# Patient Record
Sex: FEMALE | HISPANIC OR LATINO | Employment: FULL TIME | URBAN - METROPOLITAN AREA
[De-identification: names, ages, dates, MRNs, and addresses within clinical notes are randomized per-mention and may not be internally consistent; named-entity substitution may affect disease eponyms.]

---

## 2017-02-10 ENCOUNTER — OFFICE VISIT (OUTPATIENT)
Dept: URGENT CARE | Facility: CLINIC | Age: 32
End: 2017-02-10
Payer: COMMERCIAL

## 2017-02-10 VITALS
BODY MASS INDEX: 36.82 KG/M2 | TEMPERATURE: 98.8 F | HEIGHT: 61 IN | DIASTOLIC BLOOD PRESSURE: 70 MMHG | SYSTOLIC BLOOD PRESSURE: 114 MMHG | WEIGHT: 195 LBS | RESPIRATION RATE: 16 BRPM | OXYGEN SATURATION: 98 % | HEART RATE: 84 BPM

## 2017-02-10 DIAGNOSIS — J02.9 EXUDATIVE PHARYNGITIS: Primary | ICD-10-CM

## 2017-02-10 DIAGNOSIS — Z20.818 STREP THROAT EXPOSURE: ICD-10-CM

## 2017-02-10 LAB
INT CON NEG: NEGATIVE
INT CON POS: POSITIVE
S PYO AG THROAT QL: NORMAL

## 2017-02-10 PROCEDURE — 99204 OFFICE O/P NEW MOD 45 MIN: CPT | Performed by: PHYSICIAN ASSISTANT

## 2017-02-10 PROCEDURE — 87880 STREP A ASSAY W/OPTIC: CPT | Performed by: PHYSICIAN ASSISTANT

## 2017-02-10 RX ORDER — AZITHROMYCIN 250 MG/1
TABLET, FILM COATED ORAL
Qty: 6 TAB | Refills: 0 | Status: SHIPPED | OUTPATIENT
Start: 2017-02-10 | End: 2017-02-15

## 2017-02-10 NOTE — MR AVS SNAPSHOT
"        Amy Correa   2/10/2017 5:45 PM   Office Visit   MRN: [id number]    Department:  [facility]   Dept Phone:  [phone]    Description:  Female : 1985   Provider:  Tristen Watson PA-C           Reason for Visit     Pharyngitis son had strep and daughter just went home with strep, sore throat started last night wiht dryness       Allergies as of 2/10/2017     Allergen Noted Reactions    Penicillins 2012   Rash      You were diagnosed with     Exudative pharyngitis   [438777]  -  Primary     Strep throat exposure   [[id number]]         Vital Signs     Blood Pressure Pulse Temperature Respirations Height Weight    114/70 mmHg 84 37.1 °C (98.8 °F) 16 1.549 m (5' 1\") 88.451 kg (195 lb)    Body Mass Index Oxygen Saturation Last Menstrual Period Breastfeeding? Smoking Status       36.86 kg/m2 98% 10/14/2013 No Never Smoker        Basic Information     Date Of Birth Sex Race Ethnicity Preferred Language    1985 Female  or   Origin (Indian,Surinamese,Sammarinese,Gibraltarian, etc) English      Problem List              ICD-10-CM Priority Class Noted - Resolved    Family planning education, guidance, and counseling Z30.09   2014 - Present      Health Maintenance        Date Due Completion Dates    IMM INFLUENZA (1) 2016 ---    PAP SMEAR 2017    IMM DTaP/Tdap/Td Vaccine (2 - Td) 6/3/2024 6/3/2014            Current Immunizations     Tdap Vaccine 6/3/2014      Below and/or attached are the medications your provider expects you to take. Review all of your home medications and newly ordered medications with your provider and/or pharmacist. Follow medication instructions as directed by your provider and/or pharmacist. Please keep your medication list with you and share with your provider. Update the information when medications are discontinued, doses are changed, or new medications (including over-the-counter products) are added; and carry medication " information at all times in the event of emergency situations     Allergies:  PENICILLINS - Rash               Medications  Valid as of: February 10, 2017 -  6:06 PM    Generic Name Brand Name Tablet Size Instructions for use    Azithromycin (Tab) ZITHROMAX 250 MG Z-pack: use as directed        Docusate Sodium (Cap) COLACE 100 MG Take 1 Cap by mouth 2 times a day.        Ferrous Sulfate (Tablet Delayed Response) ferrous sulfate 325 (65 FE) MG Take 1 Tab by mouth 3 times a day, with meals.        Hydrocodone-Acetaminophen (Tab) NORCO 5-325 MG Take 1 Tab by mouth every four hours as needed ((Pain Scale 4-6); if allergic/unable to tolerate oxycodone, or if acetaminophen ineffective).        Ibuprofen (Tab) MOTRIN 600 MG Take 1 Tab by mouth every 6 hours as needed (Cramping).        Nitrofurantoin Monohyd Macro (Cap) MACROBID 100 MG Take 1 Cap by mouth 2 times a day.        Polyethylene Glycol 3350 (Pack) MIRALAX  Take 1 Packet by mouth every day.        Prenatal Vit-Fe Fumarate-FA (Tab) PRENATAL S 27-0.8 MG Take 1 Tab by mouth every morning.        .                 Medicines prescribed today were sent to:     [id number] - [place], [place] - [address]    [address] [place] [address]    Phone: [phone] Fax: [phone]    Open 24 Hours?: No      Medication refill instructions:       If your prescription bottle indicates you have medication refills left, it is not necessary to call your provider’s office. Please contact your pharmacy and they will refill your medication.    If your prescription bottle indicates you do not have any refills left, you may request refills at any time through one of the following ways: The online [employer] system (except Urgent Care), by calling your provider’s office, or by asking your pharmacy to contact your provider’s office with a refill request. Medication refills are processed only during regular business hours and may not be available until the next business day.  "Your provider may request additional information or to have a follow-up visit with you prior to refilling your medication.   *Please Note: Medication refills are assigned a new Rx number when refilled electronically. Your pharmacy may indicate that no refills were authorized even though a new prescription for the same medication is available at the pharmacy. Please request the medicine by name with the pharmacy before contacting your provider for a refill.        Instructions    Strep Throat  Strep throat is an infection of the throat caused by a bacteria named Streptococcus pyogenes. Your health care provider may call the infection streptococcal \"tonsillitis\" or \"pharyngitis\" depending on whether there are signs of inflammation in the tonsils or back of the throat. Strep throat is most common in children aged 5-15 years during the cold months of the year, but it can occur in people of any age during any season. This infection is spread from person to person (contagious) through coughing, sneezing, or other close contact.  SIGNS AND SYMPTOMS   · Fever or chills.  · Painful, swollen, red tonsils or throat.  · Pain or difficulty when swallowing.  · White or yellow spots on the tonsils or throat.  · Swollen, tender lymph nodes or \"glands\" of the neck or under the jaw.  · Red rash all over the body (rare).  DIAGNOSIS   Many different infections can cause the same symptoms. A test must be done to confirm the diagnosis so the right treatment can be given. A \"rapid strep test\" can help your health care provider make the diagnosis in a few minutes. If this test is not available, a light swab of the infected area can be used for a throat culture test. If a throat culture test is done, results are usually available in a day or two.  TREATMENT   Strep throat is treated with antibiotic medicine.  HOME CARE INSTRUCTIONS   · Gargle with 1 tsp of salt in 1 cup of warm water, 3-4 times per day or as needed for comfort.  · Family " members who also have a sore throat or fever should be tested for strep throat and treated with antibiotics if they have the strep infection.  · Make sure everyone in your household washes their hands well.  · Do not share food, drinking cups, or personal items that could cause the infection to spread to others.  · You may need to eat a soft food diet until your sore throat gets better.  · Drink enough water and fluids to keep your urine clear or pale yellow. This will help prevent dehydration.  · Get plenty of rest.  · Stay home from school, day care, or work until you have been on antibiotics for 24 hours.  · Take medicines only as directed by your health care provider.  · Take your antibiotic medicine as directed by your health care provider. Finish it even if you start to feel better.  SEEK MEDICAL CARE IF:   · The glands in your neck continue to enlarge.  · You develop a rash, cough, or earache.  · You cough up green, yellow-brown, or bloody sputum.  · You have pain or discomfort not controlled by medicines.  · Your problems seem to be getting worse rather than better.  · You have a fever.  SEEK IMMEDIATE MEDICAL CARE IF:   · You develop any new symptoms such as vomiting, severe headache, stiff or painful neck, chest pain, shortness of breath, or trouble swallowing.  · You develop severe throat pain, drooling, or changes in your voice.  · You develop swelling of the neck, or the skin on the neck becomes red and tender.  · You develop signs of dehydration, such as fatigue, dry mouth, and decreased urination.  · You become increasingly sleepy, or you cannot wake up completely.  MAKE SURE YOU:  · Understand these instructions.  · Will watch your condition.  · Will get help right away if you are not doing well or get worse.     This information is not intended to replace advice given to you by your health care provider. Make sure you discuss any questions you have with your health care provider.     Document  Released: 12/15/2001 Document Revised: 01/08/2016 Document Reviewed: 04/11/2016  [employer] Interactive Patient Education ©2016 Elsevier Inc.            [employer] System Enterprise Access Code: [email or web address]  Expires: 2/24/2017 12:17 PM    Process System Enterprise  A secure, online tool to manage your health information     [employer]’s Process System Enterprise® is a secure, online tool that connects you to your personalized health information from the privacy of your home -- day or night - making it very easy for you to manage your healthcare. Once the activation process is completed, you can even access your medical information using the Process System Enterprise rhea, which is available for free in the Apple Rhea store or Google Play store.     Process System Enterprise provides the following levels of access (as shown below):   My Chart Features   [facility] Doctor [facility]  Specialists [facility]  Urgent  Care Non-[facility]  Primary Care  Doctor   Email your healthcare team securely and privately 24/7 X X X    Manage appointments: schedule your next appointment; view details of past/upcoming appointments X      Request prescription refills. X      View recent personal medical records, including lab and immunizations X X X X   View health record, including health history, allergies, medications X X X X   Read reports about your outpatient visits, procedures, consult and ER notes X X X X   See your discharge summary, which is a recap of your hospital and/or ER visit that includes your diagnosis, lab results, and care plan. X X       How to register for Process System Enterprise:  1. Go to  [email or web address].  2. Click on the Sign Up Now box, which takes you to the New Member Sign Up page. You will need to provide the following information:  a. Enter your [employer] System Enterprise Access Code exactly as it appears at the top of this page. (You will not need to use this code after you’ve completed the sign-up process. If you do not sign up before the expiration date, you must request a new code.)   b. Enter your date of birth.    c. Enter your home email address.   d. Click Submit, and follow the next screen’s instructions.  3. Create a [employer] ID. This will be your 4-Tell login ID and cannot be changed, so think of one that is secure and easy to remember.  4. Create a [employer] password. You can change your password at any time.  5. Enter your Password Reset Question and Answer. This can be used at a later time if you forget your password.   6. Enter your e-mail address. This allows you to receive e-mail notifications when new information is available in 4-Tell.  7. Click Sign Up. You can now view your health information.    For assistance activating your [employer]-Tell account, call [phone]

## 2017-02-11 NOTE — PATIENT INSTRUCTIONS
"Strep Throat  Strep throat is an infection of the throat caused by a bacteria named Streptococcus pyogenes. Your health care provider may call the infection streptococcal \"tonsillitis\" or \"pharyngitis\" depending on whether there are signs of inflammation in the tonsils or back of the throat. Strep throat is most common in children aged 5-15 years during the cold months of the year, but it can occur in people of any age during any season. This infection is spread from person to person (contagious) through coughing, sneezing, or other close contact.  SIGNS AND SYMPTOMS   · Fever or chills.  · Painful, swollen, red tonsils or throat.  · Pain or difficulty when swallowing.  · White or yellow spots on the tonsils or throat.  · Swollen, tender lymph nodes or \"glands\" of the neck or under the jaw.  · Red rash all over the body (rare).  DIAGNOSIS   Many different infections can cause the same symptoms. A test must be done to confirm the diagnosis so the right treatment can be given. A \"rapid strep test\" can help your health care provider make the diagnosis in a few minutes. If this test is not available, a light swab of the infected area can be used for a throat culture test. If a throat culture test is done, results are usually available in a day or two.  TREATMENT   Strep throat is treated with antibiotic medicine.  HOME CARE INSTRUCTIONS   · Gargle with 1 tsp of salt in 1 cup of warm water, 3-4 times per day or as needed for comfort.  · Family members who also have a sore throat or fever should be tested for strep throat and treated with antibiotics if they have the strep infection.  · Make sure everyone in your household washes their hands well.  · Do not share food, drinking cups, or personal items that could cause the infection to spread to others.  · You may need to eat a soft food diet until your sore throat gets better.  · Drink enough water and fluids to keep your urine clear or pale yellow. This will help prevent " dehydration.  · Get plenty of rest.  · Stay home from school, day care, or work until you have been on antibiotics for 24 hours.  · Take medicines only as directed by your health care provider.  · Take your antibiotic medicine as directed by your health care provider. Finish it even if you start to feel better.  SEEK MEDICAL CARE IF:   · The glands in your neck continue to enlarge.  · You develop a rash, cough, or earache.  · You cough up green, yellow-brown, or bloody sputum.  · You have pain or discomfort not controlled by medicines.  · Your problems seem to be getting worse rather than better.  · You have a fever.  SEEK IMMEDIATE MEDICAL CARE IF:   · You develop any new symptoms such as vomiting, severe headache, stiff or painful neck, chest pain, shortness of breath, or trouble swallowing.  · You develop severe throat pain, drooling, or changes in your voice.  · You develop swelling of the neck, or the skin on the neck becomes red and tender.  · You develop signs of dehydration, such as fatigue, dry mouth, and decreased urination.  · You become increasingly sleepy, or you cannot wake up completely.  MAKE SURE YOU:  · Understand these instructions.  · Will watch your condition.  · Will get help right away if you are not doing well or get worse.     This information is not intended to replace advice given to you by your health care provider. Make sure you discuss any questions you have with your health care provider.     Document Released: 12/15/2001 Document Revised: 01/08/2016 Document Reviewed: 04/11/2016  [employer] Interactive Patient Education ©2016 Elsevier Inc.

## 2017-02-11 NOTE — PROGRESS NOTES
Subjective:      PT is a 31 y.o. female who presents with Pharyngitis            Pharyngitis   This is a new problem. The current episode started yesterday. The problem has been gradually worsening. The pain is worse on the left side. There has been no fever. The pain is at a severity of 7/10. The pain is moderate. She has had exposure to strep. Exposure to: both of her children have STREP A. She has tried cool liquids and gargles for the symptoms. The treatment provided no relief.   PT presents to  clinic today complaining of sore throat, fevers, chills, watery eyes, pressure in ears, cough, fatigue, runny nose. PT denies CP, SOB, NVD, abdominal pain, joint pain. PT states these symptoms began around 2 days ago and that the pt's family has been sick on and off for the last week. Pt has not taken any medications for this condition. PT states the pain is a 3/10 with swallowing, aching in nature and worse at night. The pt's medication list, problem list, and allergies have been evaluated and reviewed during today's visit.      PMH:  Negative per pt.      PSH:  Past Surgical History   Procedure Laterality Date   • Dilation and curettage          • Hernia repair       9 yrs old       Fam Hx:    family history includes Diabetes in her maternal grandmother and maternal uncle.  Family Status   Relation Status Death Age   • Maternal Uncle Alive    • Maternal Grandmother     • Mother     • Father     • Sister Alive        Soc HX:  Social History     Social History   • Marital Status:      Spouse Name: N/A   • Number of Children: N/A   • Years of Education: N/A     Occupational History   • Not on file.     Social History Main Topics   • Smoking status: Never Smoker    • Smokeless tobacco: Not on file   • Alcohol Use: No      Comment: occ   • Drug Use: No   • Sexual Activity: Not on file     Other Topics Concern   • Not on file     Social History Narrative         Medications:    Current  outpatient prescriptions:   •  azithromycin (ZITHROMAX) 250 MG Tab, Z-pack: use as directed, Disp: 6 Tab, Rfl: 0  •  nitrofurantoin monohydr macro (MACROBID) 100 MG CAPS, Take 1 Cap by mouth 2 times a day., Disp: 20 Cap, Rfl: 0  •  docusate sodium (COLACE) 100 MG CAPS, Take 1 Cap by mouth 2 times a day., Disp: 60 Cap, Rfl: 3  •  polyethylene glycol/lytes (MIRALAX) PACK, Take 1 Packet by mouth every day., Disp: 1 Each, Rfl: 10  •  hydrocodone-acetaminophen (NORCO) 5-325 MG TABS per tablet, Take 1 Tab by mouth every four hours as needed ((Pain Scale 4-6); if allergic/unable to tolerate oxycodone, or if acetaminophen ineffective)., Disp: 20 Tab, Rfl: 0  •  ibuprofen (MOTRIN) 600 MG TABS, Take 1 Tab by mouth every 6 hours as needed (Cramping)., Disp: 30 Tab, Rfl: 1  •  ferrous sulfate 325 (65 FE) MG EC tablet, Take 1 Tab by mouth 3 times a day, with meals., Disp: 90 Each, Rfl: 3  •  prenatal vit/fe fumarate/fa (PRENATAL S) 27-0.8 MG TABS, Take 1 Tab by mouth every morning., Disp: , Rfl:       Allergies:  Penicillins        ROS  Constitutional: Positive for chills and malaise/fatigue.   HENT: Positive for congestion and sore throat. Negative for ear pain.    Eyes: Negative for blurred vision, double vision and photophobia.   Respiratory: Positive for cough and sputum production. Negative for hemoptysis, shortness of breath and wheezing.    Cardiovascular: Negative for chest pain and palpitations.   Gastrointestinal: Negative for nausea, vomiting, abdominal pain, diarrhea and constipation.   Genitourinary: Negative for dysuria and flank pain.   Musculoskeletal: Negative for falls and myalgias.   Skin: Negative for itching and rash.   Neurological: Positive for headaches. Negative for dizziness and tingling.   Endo/Heme/Allergies: Does not bruise/bleed easily.   Psychiatric/Behavioral: Negative for depression. The patient is not nervous/anxious.             Objective:     /70 mmHg  Pulse 84  Temp(Src) 37.1 °C (98.8  "°F)  Resp 16  Ht 1.549 m (5' 1\")  Wt 88.451 kg (195 lb)  BMI 36.86 kg/m2  SpO2 98%  LMP 10/14/2013  Breastfeeding? No     Physical Exam       Constitutional: PT is oriented to person, place, and time. PT appears well-developed and well-nourished. No distress.   HENT:   Head: Normocephalic and atraumatic.   Right Ear: Hearing, tympanic membrane, external ear and ear canal normal.   Left Ear: Hearing, tympanic membrane, external ear and ear canal normal.   Nose: Mucosal edema, rhinorrhea and sinus tenderness present. Right sinus exhibits frontal sinus tenderness. Left sinus exhibits frontal sinus tenderness.   Mouth/Throat: Uvula is midline. Mucous membranes are pale. Posterior oropharyngeal edema and posterior oropharyngeal erythema with exudate noted on exam  Eyes: Conjunctivae normal and EOM are normal. Pupils are equal, round, and reactive to light.   Neck: Normal range of motion. Neck supple. No thyromegaly present.   Cardiovascular: Normal rate, regular rhythm, normal heart sounds and intact distal pulses.  Exam reveals no gallop and no friction rub.    No murmur heard.  Pulmonary/Chest: Effort normal and breath sounds normal. No respiratory distress. PT has no wheezes. PT has no rales. PT exhibits no tenderness.   Abdominal: Soft. Bowel sounds are normal. PT exhibits no distension and no mass. There is no tenderness. There is no rebound and no guarding.   Musculoskeletal: Normal range of motion. PT exhibits no edema and no tenderness.   Lymphadenopathy:     PT has no cervical adenopathy.   Neurological: PT is alert and oriented to person, place, and time. PT displays normal reflexes. No cranial nerve deficit. PT exhibits normal muscle tone. Coordination normal.   Skin: Skin is warm and dry. No rash noted. No erythema.   Psychiatric: PT has a normal mood and affect. PT behavior is normal. Judgment and thought content normal.        Assessment/Plan:     1. Exudative pharyngitis  Back-up abx if symptoms do " not improve in 2-3 days. PT on clinical presentation has exudate on oropharynx and it's possible beyond the strep A testing that this could be a different type of strep (C/G) or A. haemolyticum     - POCT Rapid Strep A-->NEG  - azithromycin (ZITHROMAX) 250 MG Tab; Z-pack: use as directed  Dispense: 6 Tab; Refill: 0    2. Strep throat exposure    - POCT Rapid Strep A  - azithromycin (ZITHROMAX) 250 MG Tab; Z-pack: use as directed  Dispense: 6 Tab; Refill: 0    Rest, fluids encouraged.  OTC decongestant for congestion  AVS with medical info given.  Pt was in full understanding and agreement with the plan.  Follow-up as needed if symptoms worsen or fail to improve.

## 2017-03-22 ENCOUNTER — OFFICE VISIT (OUTPATIENT)
Dept: URGENT CARE | Facility: CLINIC | Age: 32
End: 2017-03-22
Payer: COMMERCIAL

## 2017-03-22 VITALS
RESPIRATION RATE: 16 BRPM | DIASTOLIC BLOOD PRESSURE: 78 MMHG | TEMPERATURE: 97.9 F | BODY MASS INDEX: 36.06 KG/M2 | HEART RATE: 93 BPM | OXYGEN SATURATION: 100 % | HEIGHT: 61 IN | WEIGHT: 191 LBS | SYSTOLIC BLOOD PRESSURE: 122 MMHG

## 2017-03-22 DIAGNOSIS — J40 BRONCHITIS: ICD-10-CM

## 2017-03-22 DIAGNOSIS — J06.9 UPPER RESPIRATORY TRACT INFECTION, UNSPECIFIED TYPE: ICD-10-CM

## 2017-03-22 PROCEDURE — 99214 OFFICE O/P EST MOD 30 MIN: CPT | Performed by: EMERGENCY MEDICINE

## 2017-03-22 RX ORDER — AZITHROMYCIN 250 MG/1
TABLET, FILM COATED ORAL
Qty: 6 TAB | Refills: 0 | Status: SHIPPED | OUTPATIENT
Start: 2017-03-22 | End: 2017-06-26

## 2017-03-22 RX ORDER — ALBUTEROL SULFATE 90 UG/1
2 AEROSOL, METERED RESPIRATORY (INHALATION) EVERY 4 HOURS PRN
Qty: 1 INHALER | Refills: 0 | Status: SHIPPED | OUTPATIENT
Start: 2017-03-22 | End: 2017-06-26

## 2017-03-22 ASSESSMENT — ENCOUNTER SYMPTOMS
DIARRHEA: 0
NAUSEA: 0
COUGH: 1
FEVER: 0
SORE THROAT: 0
VOMITING: 0

## 2017-03-22 NOTE — PROGRESS NOTES
Subjective:      Amy Correa is a 31 y.o. female who presents with Cough        This is a new problem.    HPI She states that 2 days ago she developed upper respiratory congestion and a cough. The cough has been primarily dry but has been associated with some wheezing, especially at night. The nasal secretions have been clear. She does not have a history of asthma. She has had no fever or chills. Her son was diagnosed with strep throat 10 days ago and just finished his antibiotics. She does not have a sore throat.    Review of Systems   Constitutional: Negative for fever.   HENT: Positive for congestion. Negative for ear pain and sore throat.    Respiratory: Positive for cough.    Gastrointestinal: Negative for nausea, vomiting and diarrhea.     Past Medical History   Diagnosis Date   • Known health problems: none        Past Surgical History   Procedure Laterality Date   • Dilation and curettage       2011   • Hernia repair       9 yrs old         Current outpatient prescriptions:   •  albuterol 108 (90 BASE) MCG/ACT Aero Soln inhalation aerosol, Inhale 2 Puffs by mouth every four hours as needed for Shortness of Breath., Disp: 1 Inhaler, Rfl: 0  •  azithromycin (ZITHROMAX) 250 MG Tab, Use as directed, Disp: 6 Tab, Rfl: 0  •  nitrofurantoin monohydr macro (MACROBID) 100 MG CAPS, Take 1 Cap by mouth 2 times a day., Disp: 20 Cap, Rfl: 0  •  docusate sodium (COLACE) 100 MG CAPS, Take 1 Cap by mouth 2 times a day., Disp: 60 Cap, Rfl: 3  •  polyethylene glycol/lytes (MIRALAX) PACK, Take 1 Packet by mouth every day., Disp: 1 Each, Rfl: 10  •  hydrocodone-acetaminophen (NORCO) 5-325 MG TABS per tablet, Take 1 Tab by mouth every four hours as needed ((Pain Scale 4-6); if allergic/unable to tolerate oxycodone, or if acetaminophen ineffective)., Disp: 20 Tab, Rfl: 0  •  ibuprofen (MOTRIN) 600 MG TABS, Take 1 Tab by mouth every 6 hours as needed (Cramping)., Disp: 30 Tab, Rfl: 1  •  ferrous sulfate 325 (65 FE) MG EC  "tablet, Take 1 Tab by mouth 3 times a day, with meals., Disp: 90 Each, Rfl: 3  •  prenatal vit/fe fumarate/fa (PRENATAL S) 27-0.8 MG TABS, Take 1 Tab by mouth every morning., Disp: , Rfl:     Allergies   Allergen Reactions   • Penicillins Rash       Social History     Social History   • Marital Status:      Spouse Name: N/A   • Number of Children: N/A   • Years of Education: N/A     Social History Main Topics   • Smoking status: Never Smoker    • Smokeless tobacco: None   • Alcohol Use: No      Comment: occ   • Drug Use: No   • Sexual Activity: Not Asked     Other Topics Concern   • None     Social History Narrative       Family History   Problem Relation Age of Onset   • Diabetes Maternal Uncle    • Diabetes Maternal Grandmother        Family Status   Relation Status Death Age   • Maternal Uncle Alive    • Maternal Grandmother     • Mother     • Father     • Sister Alive                Objective:     /78 mmHg  Pulse 93  Temp(Src) 36.6 °C (97.9 °F)  Resp 16  Ht 1.549 m (5' 1\")  Wt 86.637 kg (191 lb)  BMI 36.11 kg/m2  SpO2 100%  LMP 2017     Physical Exam   Constitutional: She is oriented to person, place, and time. She appears well-developed and well-nourished. No distress.   HENT:   Head: Normocephalic.   Mouth/Throat: Oropharynx is clear and moist. No oropharyngeal exudate.   Tympanic membranes and ear canals are normal. Her nose is congested.   Eyes: Conjunctivae are normal.   Neck: Normal range of motion. Neck supple.   Cardiovascular: Normal rate, regular rhythm and normal heart sounds.    Pulmonary/Chest: Effort normal. No respiratory distress.   Her lungs are clear with quiet breathing but there is wheezing heard when she coughs. There are no rales or rhonchi.   Musculoskeletal: Normal range of motion.   Neurological: She is alert and oriented to person, place, and time.   Gait and speech are normal   Skin: Skin is warm and dry.   Psychiatric: She has a " normal mood and affect.   Vitals reviewed.       At this time for her history and exam are consistent with a viral upper respiratory infection and bronchitis. I prescribed an albuterol inhaler to use as needed for wheezing. I have told her that at this time she does not need antibiotics but I have written a backup prescription for Zithromax in case she should worsen with fever and significant purulent sputum. She was given written instructions for an upper respiratory infection and bronchitis. I have recommended Robitussin-DM or equivalent for her cough.       Assessment/Plan:     1. Bronchitis    - albuterol 108 (90 BASE) MCG/ACT Aero Soln inhalation aerosol; Inhale 2 Puffs by mouth every four hours as needed for Shortness of Breath.  Dispense: 1 Inhaler; Refill: 0  - azithromycin (ZITHROMAX) 250 MG Tab; Use as directed  Dispense: 6 Tab; Refill: 0    2. Upper respiratory tract infection, unspecified type

## 2017-03-22 NOTE — PATIENT INSTRUCTIONS
Acute Bronchitis  At this time your history and exam are consistent with a viral upper respiratory infection and viral bronchitis. I have written a backup prescription of Zithromax to fill if you develop fever or yellow and green sputum. Albuterol inhaler as needed for wheezing.  Bronchitis is inflammation of the airways that extend from the windpipe into the lungs (bronchi). The inflammation often causes mucus to develop. This leads to a cough, which is the most common symptom of bronchitis.   In acute bronchitis, the condition usually develops suddenly and goes away over time, usually in a couple weeks. Smoking, allergies, and asthma can make bronchitis worse. Repeated episodes of bronchitis may cause further lung problems.   CAUSES  Acute bronchitis is most often caused by the same virus that causes a cold. The virus can spread from person to person (contagious) through coughing, sneezing, and touching contaminated objects.  SIGNS AND SYMPTOMS   · Cough.    · Fever.    · Coughing up mucus.    · Body aches.    · Chest congestion.    · Chills.    · Shortness of breath.    · Sore throat.    DIAGNOSIS   Acute bronchitis is usually diagnosed through a physical exam. Your health care provider will also ask you questions about your medical history. Tests, such as chest X-rays, are sometimes done to rule out other conditions.   TREATMENT   Acute bronchitis usually goes away in a couple weeks. Oftentimes, no medical treatment is necessary. Medicines are sometimes given for relief of fever or cough. Antibiotic medicines are usually not needed but may be prescribed in certain situations. In some cases, an inhaler may be recommended to help reduce shortness of breath and control the cough. A cool mist vaporizer may also be used to help thin bronchial secretions and make it easier to clear the chest.   HOME CARE INSTRUCTIONS  · Get plenty of rest.    · Drink enough fluids to keep your urine clear or pale yellow (unless you  have a medical condition that requires fluid restriction). Increasing fluids may help thin your respiratory secretions (sputum) and reduce chest congestion, and it will prevent dehydration.    · Take medicines only as directed by your health care provider.  · If you were prescribed an antibiotic medicine, finish it all even if you start to feel better.  · Avoid smoking and secondhand smoke. Exposure to cigarette smoke or irritating chemicals will make bronchitis worse. If you are a smoker, consider using nicotine gum or skin patches to help control withdrawal symptoms. Quitting smoking will help your lungs heal faster.    · Reduce the chances of another bout of acute bronchitis by washing your hands frequently, avoiding people with cold symptoms, and trying not to touch your hands to your mouth, nose, or eyes.    · Keep all follow-up visits as directed by your health care provider.    SEEK MEDICAL CARE IF:  Your symptoms do not improve after 1 week of treatment.   SEEK IMMEDIATE MEDICAL CARE IF:  · You develop an increased fever or chills.    · You have chest pain.    · You have severe shortness of breath.  · You have bloody sputum.    · You develop dehydration.  · You faint or repeatedly feel like you are going to pass out.  · You develop repeated vomiting.  · You develop a severe headache.  MAKE SURE YOU:   · Understand these instructions.  · Will watch your condition.  · Will get help right away if you are not doing well or get worse.     This information is not intended to replace advice given to you by your health care provider. Make sure you discuss any questions you have with your health care provider.     Document Released: 01/25/2006 Document Revised: 01/08/2016 Document Reviewed: 06/10/2014  [employer] Interactive Patient Education ©2016 [employer]    Upper Respiratory Infection, Adult  Most upper respiratory infections (URIs) are a viral infection of the air passages leading to the lungs. A URI affects  "the nose, throat, and upper air passages. The most common type of URI is nasopharyngitis and is typically referred to as \"the common cold.\"  URIs run their course and usually go away on their own. Most of the time, a URI does not require medical attention, but sometimes a bacterial infection in the upper airways can follow a viral infection. This is called a secondary infection. Sinus and middle ear infections are common types of secondary upper respiratory infections.  Bacterial pneumonia can also complicate a URI. A URI can worsen asthma and chronic obstructive pulmonary disease (COPD). Sometimes, these complications can require emergency medical care and may be life threatening.   CAUSES  Almost all URIs are caused by viruses. A virus is a type of germ and can spread from one person to another.   RISKS FACTORS  You may be at risk for a URI if:   · You smoke.    · You have chronic heart or lung disease.  · You have a weakened defense (immune) system.    · You are very young or very old.    · You have nasal allergies or asthma.  · You work in crowded or poorly ventilated areas.  · You work in health care facilities or schools.  SIGNS AND SYMPTOMS   Symptoms typically develop 2-3 days after you come in contact with a cold virus. Most viral URIs last 7-10 days. However, viral URIs from the influenza virus (flu virus) can last 14-18 days and are typically more severe. Symptoms may include:   · Runny or stuffy (congested) nose.    · Sneezing.    · Cough.    · Sore throat.    · Headache.    · Fatigue.    · Fever.    · Loss of appetite.    · Pain in your forehead, behind your eyes, and over your cheekbones (sinus pain).  · Muscle aches.    DIAGNOSIS   Your health care provider may diagnose a URI by:  · Physical exam.  · Tests to check that your symptoms are not due to another condition such as:  · Strep throat.  · Sinusitis.  · Pneumonia.  · Asthma.  TREATMENT   A URI goes away on its own with time. It cannot be cured " with medicines, but medicines may be prescribed or recommended to relieve symptoms. Medicines may help:  · Reduce your fever.  · Reduce your cough.  · Relieve nasal congestion.  HOME CARE INSTRUCTIONS   · Take medicines only as directed by your health care provider.    · Gargle warm saltwater or take cough drops to comfort your throat as directed by your health care provider.  · Use a warm mist humidifier or inhale steam from a shower to increase air moisture. This may make it easier to breathe.  · Drink enough fluid to keep your urine clear or pale yellow.    · Eat soups and other clear broths and maintain good nutrition.    · Rest as needed.    · Return to work when your temperature has returned to normal or as your health care provider advises. You may need to stay home longer to avoid infecting others. You can also use a face mask and careful hand washing to prevent spread of the virus.  · Increase the usage of your inhaler if you have asthma.    · Do not use any tobacco products, including cigarettes, chewing tobacco, or electronic cigarettes. If you need help quitting, ask your health care provider.  PREVENTION   The best way to protect yourself from getting a cold is to practice good hygiene.   · Avoid oral or hand contact with people with cold symptoms.    · Wash your hands often if contact occurs.    There is no clear evidence that vitamin C, vitamin E, echinacea, or exercise reduces the chance of developing a cold. However, it is always recommended to get plenty of rest, exercise, and practice good nutrition.   SEEK MEDICAL CARE IF:   · You are getting worse rather than better.    · Your symptoms are not controlled by medicine.    · You have chills.  · You have worsening shortness of breath.  · You have brown or red mucus.  · You have yellow or brown nasal discharge.  · You have pain in your face, especially when you bend forward.  · You have a fever.  · You have swollen neck glands.  · You have pain while  swallowing.  · You have white areas in the back of your throat.  SEEK IMMEDIATE MEDICAL CARE IF:   · You have severe or persistent:  ¨ Headache.  ¨ Ear pain.  ¨ Sinus pain.  ¨ Chest pain.  · You have chronic lung disease and any of the following:  ¨ Wheezing.  ¨ Prolonged cough.  ¨ Coughing up blood.  ¨ A change in your usual mucus.  · You have a stiff neck.  · You have changes in your:  ¨ Vision.  ¨ Hearing.  ¨ Thinking.  ¨ Mood.  MAKE SURE YOU:   · Understand these instructions.  · Will watch your condition.  · Will get help right away if you are not doing well or get worse.     This information is not intended to replace advice given to you by your health care provider. Make sure you discuss any questions you have with your health care provider.     Document Released: 06/13/2002 Document Revised: 05/03/2016 Document Reviewed: 03/25/2015  [employer] Interactive Patient Education ©2016 [employer]

## 2017-03-22 NOTE — Clinical Note
March 22, 2017         Patient: Amy Correa   YOB: 1985   Date of Visit: 3/22/2017           To Whom it May Concern:    Amy Correa was seen in my clinic on 3/22/2017. She should be excused from work on March 23 and 24th.    If you have any questions or concerns, please don't hesitate to call.        Sincerely,           Henri Montana M.D.  Electronically Signed

## 2017-03-22 NOTE — MR AVS SNAPSHOT
"        Amy Correa   3/22/2017 2:30 PM   Office Visit   MRN: [id number]    Department:  [facility]   Dept Phone:  [phone]    Description:  Female : 1985   Provider:  Henri Montana M.D.           Reason for Visit     Cough hurts when coughing/pressure x 3 days      Allergies as of 3/22/2017     Allergen Noted Reactions    Penicillins 2012   Rash      You were diagnosed with     Bronchitis   [261721]       Upper respiratory tract infection, unspecified type   [4416919]         Vital Signs     Blood Pressure Pulse Temperature Respirations Height Weight    122/78 mmHg 93 36.6 °C (97.9 °F) 16 1.549 m (5' 1\") 86.637 kg (191 lb)    Body Mass Index Oxygen Saturation Last Menstrual Period Smoking Status          36.11 kg/m2 100% 2017 Never Smoker         Basic Information     Date Of Birth Sex Race Ethnicity Preferred Language    1985 Female  or   Origin ([place],Chilean,Qatari,Felix, etc) English      Problem List              ICD-10-CM Priority Class Noted - Resolved    Family planning education, guidance, and counseling Z30.09   2014 - Present      Health Maintenance        Date Due Completion Dates    IMM INFLUENZA (1) 2016 ---    PAP SMEAR 2017    IMM DTaP/Tdap/Td Vaccine (2 - Td) 6/3/2024 6/3/2014            Current Immunizations     Tdap Vaccine 6/3/2014      Below and/or attached are the medications your provider expects you to take. Review all of your home medications and newly ordered medications with your provider and/or pharmacist. Follow medication instructions as directed by your provider and/or pharmacist. Please keep your medication list with you and share with your provider. Update the information when medications are discontinued, doses are changed, or new medications (including over-the-counter products) are added; and carry medication information at all times in the event of emergency situations     Allergies:  " PENICILLINS - Rash               Medications  Valid as of: March 22, 2017 -  2:46 PM    Generic Name Brand Name Tablet Size Instructions for use    Albuterol Sulfate (Aero Soln) albuterol 108 (90 BASE) MCG/ACT Inhale 2 Puffs by mouth every four hours as needed for Shortness of Breath.        Azithromycin (Tab) ZITHROMAX 250 MG Use as directed        Docusate Sodium (Cap) COLACE 100 MG Take 1 Cap by mouth 2 times a day.        Ferrous Sulfate (Tablet Delayed Response) ferrous sulfate 325 (65 FE) MG Take 1 Tab by mouth 3 times a day, with meals.        Hydrocodone-Acetaminophen (Tab) NORCO 5-325 MG Take 1 Tab by mouth every four hours as needed ((Pain Scale 4-6); if allergic/unable to tolerate oxycodone, or if acetaminophen ineffective).        Ibuprofen (Tab) MOTRIN 600 MG Take 1 Tab by mouth every 6 hours as needed (Cramping).        Nitrofurantoin Monohyd Macro (Cap) MACROBID 100 MG Take 1 Cap by mouth 2 times a day.        Polyethylene Glycol 3350 (Pack) MIRALAX  Take 1 Packet by mouth every day.        Prenatal Vit-Fe Fumarate-FA (Tab) PRENATAL S 27-0.8 MG Take 1 Tab by mouth every morning.        .                 Medicines prescribed today were sent to:     [facility]/PHARMACY #[id number] - [place], [place] - [address]    [address] [place] [address]    Phone: [phone] Fax: [phone]    Open 24 Hours?: No      Medication refill instructions:       If your prescription bottle indicates you have medication refills left, it is not necessary to call your provider’s office. Please contact your pharmacy and they will refill your medication.    If your prescription bottle indicates you do not have any refills left, you may request refills at any time through one of the following ways: The online [employer] (ProCiv) system (except Urgent Care), by calling your provider’s office, or by asking your pharmacy to contact your provider’s office with a refill request. Medication refills are processed only during regular business  hours and may not be available until the next business day. Your provider may request additional information or to have a follow-up visit with you prior to refilling your medication.   *Please Note: Medication refills are assigned a new Rx number when refilled electronically. Your pharmacy may indicate that no refills were authorized even though a new prescription for the same medication is available at the pharmacy. Please request the medicine by name with the pharmacy before contacting your provider for a refill.        Instructions    Acute Bronchitis  At this time your history and exam are consistent with a viral upper respiratory infection and viral bronchitis. I have written a backup prescription of Zithromax to fill if you develop fever or yellow and green sputum. Albuterol inhaler as needed for wheezing.  Bronchitis is inflammation of the airways that extend from the windpipe into the lungs (bronchi). The inflammation often causes mucus to develop. This leads to a cough, which is the most common symptom of bronchitis.   In acute bronchitis, the condition usually develops suddenly and goes away over time, usually in a couple weeks. Smoking, allergies, and asthma can make bronchitis worse. Repeated episodes of bronchitis may cause further lung problems.   CAUSES  Acute bronchitis is most often caused by the same virus that causes a cold. The virus can spread from person to person (contagious) through coughing, sneezing, and touching contaminated objects.  SIGNS AND SYMPTOMS   · Cough.    · Fever.    · Coughing up mucus.    · Body aches.    · Chest congestion.    · Chills.    · Shortness of breath.    · Sore throat.    DIAGNOSIS   Acute bronchitis is usually diagnosed through a physical exam. Your health care provider will also ask you questions about your medical history. Tests, such as chest X-rays, are sometimes done to rule out other conditions.   TREATMENT   Acute bronchitis usually goes away in a couple  weeks. Oftentimes, no medical treatment is necessary. Medicines are sometimes given for relief of fever or cough. Antibiotic medicines are usually not needed but may be prescribed in certain situations. In some cases, an inhaler may be recommended to help reduce shortness of breath and control the cough. A cool mist vaporizer may also be used to help thin bronchial secretions and make it easier to clear the chest.   HOME CARE INSTRUCTIONS  · Get plenty of rest.    · Drink enough fluids to keep your urine clear or pale yellow (unless you have a medical condition that requires fluid restriction). Increasing fluids may help thin your respiratory secretions (sputum) and reduce chest congestion, and it will prevent dehydration.    · Take medicines only as directed by your health care provider.  · If you were prescribed an antibiotic medicine, finish it all even if you start to feel better.  · Avoid smoking and secondhand smoke. Exposure to cigarette smoke or irritating chemicals will make bronchitis worse. If you are a smoker, consider using nicotine gum or skin patches to help control withdrawal symptoms. Quitting smoking will help your lungs heal faster.    · Reduce the chances of another bout of acute bronchitis by washing your hands frequently, avoiding people with cold symptoms, and trying not to touch your hands to your mouth, nose, or eyes.    · Keep all follow-up visits as directed by your health care provider.    SEEK MEDICAL CARE IF:  Your symptoms do not improve after 1 week of treatment.   SEEK IMMEDIATE MEDICAL CARE IF:  · You develop an increased fever or chills.    · You have chest pain.    · You have severe shortness of breath.  · You have bloody sputum.    · You develop dehydration.  · You faint or repeatedly feel like you are going to pass out.  · You develop repeated vomiting.  · You develop a severe headache.  MAKE SURE YOU:   · Understand these instructions.  · Will watch your condition.  · Will get  "help right away if you are not doing well or get worse.     This information is not intended to replace advice given to you by your health care provider. Make sure you discuss any questions you have with your health care provider.     Document Released: 01/25/2006 Document Revised: 01/08/2016 Document Reviewed: 06/10/2014  [employer] Interactive Patient Education ©2016 [employer]    Upper Respiratory Infection, Adult  Most upper respiratory infections (URIs) are a viral infection of the air passages leading to the lungs. A URI affects the nose, throat, and upper air passages. The most common type of URI is nasopharyngitis and is typically referred to as \"the common cold.\"  URIs run their course and usually go away on their own. Most of the time, a URI does not require medical attention, but sometimes a bacterial infection in the upper airways can follow a viral infection. This is called a secondary infection. Sinus and middle ear infections are common types of secondary upper respiratory infections.  Bacterial pneumonia can also complicate a URI. A URI can worsen asthma and chronic obstructive pulmonary disease (COPD). Sometimes, these complications can require emergency medical care and may be life threatening.   CAUSES  Almost all URIs are caused by viruses. A virus is a type of germ and can spread from one person to another.   RISKS FACTORS  You may be at risk for a URI if:   · You smoke.    · You have chronic heart or lung disease.  · You have a weakened defense (immune) system.    · You are very young or very old.    · You have nasal allergies or asthma.  · You work in crowded or poorly ventilated areas.  · You work in health care facilities or schools.  SIGNS AND SYMPTOMS   Symptoms typically develop 2-3 days after you come in contact with a cold virus. Most viral URIs last 7-10 days. However, viral URIs from the influenza virus (flu virus) can last 14-18 days and are typically more severe. Symptoms may " include:   · Runny or stuffy (congested) nose.    · Sneezing.    · Cough.    · Sore throat.    · Headache.    · Fatigue.    · Fever.    · Loss of appetite.    · Pain in your forehead, behind your eyes, and over your cheekbones (sinus pain).  · Muscle aches.    DIAGNOSIS   Your health care provider may diagnose a URI by:  · Physical exam.  · Tests to check that your symptoms are not due to another condition such as:  · Strep throat.  · Sinusitis.  · Pneumonia.  · Asthma.  TREATMENT   A URI goes away on its own with time. It cannot be cured with medicines, but medicines may be prescribed or recommended to relieve symptoms. Medicines may help:  · Reduce your fever.  · Reduce your cough.  · Relieve nasal congestion.  HOME CARE INSTRUCTIONS   · Take medicines only as directed by your health care provider.    · Gargle warm saltwater or take cough drops to comfort your throat as directed by your health care provider.  · Use a warm mist humidifier or inhale steam from a shower to increase air moisture. This may make it easier to breathe.  · Drink enough fluid to keep your urine clear or pale yellow.    · Eat soups and other clear broths and maintain good nutrition.    · Rest as needed.    · Return to work when your temperature has returned to normal or as your health care provider advises. You may need to stay home longer to avoid infecting others. You can also use a face mask and careful hand washing to prevent spread of the virus.  · Increase the usage of your inhaler if you have asthma.    · Do not use any tobacco products, including cigarettes, chewing tobacco, or electronic cigarettes. If you need help quitting, ask your health care provider.  PREVENTION   The best way to protect yourself from getting a cold is to practice good hygiene.   · Avoid oral or hand contact with people with cold symptoms.    · Wash your hands often if contact occurs.    There is no clear evidence that vitamin C, vitamin E, echinacea, or  exercise reduces the chance of developing a cold. However, it is always recommended to get plenty of rest, exercise, and practice good nutrition.   SEEK MEDICAL CARE IF:   · You are getting worse rather than better.    · Your symptoms are not controlled by medicine.    · You have chills.  · You have worsening shortness of breath.  · You have brown or red mucus.  · You have yellow or brown nasal discharge.  · You have pain in your face, especially when you bend forward.  · You have a fever.  · You have swollen neck glands.  · You have pain while swallowing.  · You have white areas in the back of your throat.  SEEK IMMEDIATE MEDICAL CARE IF:   · You have severe or persistent:  ¨ Headache.  ¨ Ear pain.  ¨ Sinus pain.  ¨ Chest pain.  · You have chronic lung disease and any of the following:  ¨ Wheezing.  ¨ Prolonged cough.  ¨ Coughing up blood.  ¨ A change in your usual mucus.  · You have a stiff neck.  · You have changes in your:  ¨ Vision.  ¨ Hearing.  ¨ Thinking.  ¨ Mood.  MAKE SURE YOU:   · Understand these instructions.  · Will watch your condition.  · Will get help right away if you are not doing well or get worse.     This information is not intended to replace advice given to you by your health care provider. Make sure you discuss any questions you have with your health care provider.     Document Released: 06/13/2002 Document Revised: 05/03/2016 Document Reviewed: 03/25/2015  [employer] Interactive Patient Education ©2016 Elsevier Inc.            [employer] Access Code: [email or web address]  Expires: 4/8/2017 12:45 PM    [employer]  A secure, online tool to manage your health information     [employer]® is a secure, online tool that connects you to your personalized health information from the privacy of your home -- day or night - making it very easy for you to manage your healthcare. Once the activation process is completed, you can even access your medical information using the [employer] calos, which is  available for free in the Apple Rhea store or Google Play store.     Your[employer] Solutions provides the following levels of access (as shown below):   My Chart Features   [facility] Doctor [facility]  Specialists [facility]  Urgent  Care Non-[facility]  Primary Care  Doctor   Email your healthcare team securely and privately 24/7 X X X    Manage appointments: schedule your next appointment; view details of past/upcoming appointments X      Request prescription refills. X      View recent personal medical records, including lab and immunizations X X X X   View health record, including health history, allergies, medications X X X X   Read reports about your outpatient visits, procedures, consult and ER notes X X X X   See your discharge summary, which is a recap of your hospital and/or ER visit that includes your diagnosis, lab results, and care plan. X X       How to register for Your[employer]:  1. Go to  [email or web address].  2. Click on the Sign Up Now box, which takes you to the New Member Sign Up page. You will need to provide the following information:  a. Enter your [employer] Access Code exactly as it appears at the top of this page. (You will not need to use this code after you’ve completed the sign-up process. If you do not sign up before the expiration date, you must request a new code.)   b. Enter your date of birth.   c. Enter your home email address.   d. Click Submit, and follow the next screen’s instructions.  3. Create a Your[employer] Solutions ID. This will be your Your[employer] login ID and cannot be changed, so think of one that is secure and easy to remember.  4. Create a [employer] password. You can change your password at any time.  5. Enter your Password Reset Question and Answer. This can be used at a later time if you forget your password.   6. Enter your e-mail address. This allows you to receive e-mail notifications when new information is available in Your[employer].  7. Click Sign Up. You can now view your health information.    For  assistance activating your [employer] account, call [phone]

## 2017-04-24 ENCOUNTER — OFFICE VISIT (OUTPATIENT)
Dept: URGENT CARE | Facility: CLINIC | Age: 32
End: 2017-04-24
Payer: COMMERCIAL

## 2017-04-24 VITALS
OXYGEN SATURATION: 98 % | SYSTOLIC BLOOD PRESSURE: 120 MMHG | WEIGHT: 189 LBS | DIASTOLIC BLOOD PRESSURE: 78 MMHG | RESPIRATION RATE: 18 BRPM | HEART RATE: 112 BPM | HEIGHT: 61 IN | BODY MASS INDEX: 35.68 KG/M2 | TEMPERATURE: 99.1 F

## 2017-04-24 DIAGNOSIS — J40 BRONCHITIS: ICD-10-CM

## 2017-04-24 DIAGNOSIS — J01.00 ACUTE MAXILLARY SINUSITIS, RECURRENCE NOT SPECIFIED: ICD-10-CM

## 2017-04-24 DIAGNOSIS — R05.9 COUGH: ICD-10-CM

## 2017-04-24 PROCEDURE — 99214 OFFICE O/P EST MOD 30 MIN: CPT | Performed by: NURSE PRACTITIONER

## 2017-04-24 RX ORDER — ALBUTEROL SULFATE 90 UG/1
2 AEROSOL, METERED RESPIRATORY (INHALATION) EVERY 6 HOURS PRN
Qty: 8.5 G | Refills: 0 | Status: SHIPPED | OUTPATIENT
Start: 2017-04-24 | End: 2017-06-26

## 2017-04-24 RX ORDER — CODEINE PHOSPHATE AND GUAIFENESIN 10; 100 MG/5ML; MG/5ML
5 SOLUTION ORAL EVERY 6 HOURS PRN
Qty: 120 ML | Refills: 0 | Status: SHIPPED | OUTPATIENT
Start: 2017-04-24 | End: 2017-06-26

## 2017-04-24 RX ORDER — DOXYCYCLINE HYCLATE 100 MG
100 TABLET ORAL 2 TIMES DAILY
Qty: 20 TAB | Refills: 0 | Status: SHIPPED | OUTPATIENT
Start: 2017-04-24 | End: 2017-05-04

## 2017-04-24 ASSESSMENT — ENCOUNTER SYMPTOMS
SPUTUM PRODUCTION: 0
GASTROINTESTINAL NEGATIVE: 1
SHORTNESS OF BREATH: 0
DIAPHORESIS: 1
SWEATS: 1
COUGH: 1
CARDIOVASCULAR NEGATIVE: 1
EYES NEGATIVE: 1
WHEEZING: 0
SORE THROAT: 1
HEADACHES: 1
CHILLS: 1
MUSCULOSKELETAL NEGATIVE: 1
HEMOPTYSIS: 0
FEVER: 1

## 2017-04-24 NOTE — MR AVS SNAPSHOT
"        Amy Correa   2017 9:00 AM   Office Visit   MRN: [id number]    Department:  [facility]   Dept Phone:  [phone]    Description:  Female : 1985   Provider:  Cathey J Hamman, A.P.N.           Reason for Visit     Cough congestion, sinus pressure and earache       Allergies as of 2017     Allergen Noted Reactions    Penicillins 2012   Rash      You were diagnosed with     Acute maxillary sinusitis, recurrence not specified   [[id number]]       Bronchitis   [237233]       Cough   [786.2.ICD-9-CM]         Vital Signs     Blood Pressure Pulse Temperature Respirations Height Weight    120/78 mmHg 112 37.3 °C (99.1 °F) 18 1.549 m (5' 1\") 85.73 kg (189 lb)    Body Mass Index Oxygen Saturation Last Menstrual Period Breastfeeding? Smoking Status       35.73 kg/m2 98% 10/14/2013 No Never Smoker        Basic Information     Date Of Birth Sex Race Ethnicity Preferred Language    1985 Female  or   Origin (Icelandic,[place]an,[place],Felix, etc) English      Problem List              ICD-10-CM Priority Class Noted - Resolved    Family planning education, guidance, and counseling Z30.09   2014 - Present      Health Maintenance        Date Due Completion Dates    PAP SMEAR 2017    IMM DTaP/Tdap/Td Vaccine (2 - Td) 6/3/2024 6/3/2014            Current Immunizations     Tdap Vaccine 6/3/2014      Below and/or attached are the medications your provider expects you to take. Review all of your home medications and newly ordered medications with your provider and/or pharmacist. Follow medication instructions as directed by your provider and/or pharmacist. Please keep your medication list with you and share with your provider. Update the information when medications are discontinued, doses are changed, or new medications (including over-the-counter products) are added; and carry medication information at all times in the event of emergency situations    "    Allergies:  PENICILLINS - Rash               Medications  Valid as of: April 24, 2017 -  9:51 AM    Generic Name Brand Name Tablet Size Instructions for use    Albuterol Sulfate (Aero Soln) albuterol 108 (90 BASE) MCG/ACT Inhale 2 Puffs by mouth every four hours as needed for Shortness of Breath.        Albuterol Sulfate (Aero Soln) albuterol 108 (90 BASE) MCG/ACT Inhale 2 Puffs by mouth every 6 hours as needed for Shortness of Breath.        Azithromycin (Tab) ZITHROMAX 250 MG Use as directed        Docusate Sodium (Cap) COLACE 100 MG Take 1 Cap by mouth 2 times a day.        Doxycycline Hyclate (Tab) VIBRAMYCIN 100 MG Take 1 Tab by mouth 2 times a day for 10 days.        Ferrous Sulfate (Tablet Delayed Response) ferrous sulfate 325 (65 FE) MG Take 1 Tab by mouth 3 times a day, with meals.        Guaifenesin-Codeine (Solution) ROBITUSSIN -10 mg/5mL Take 5 mL by mouth every 6 hours as needed.        Hydrocodone-Acetaminophen (Tab) NORCO 5-325 MG Take 1 Tab by mouth every four hours as needed ((Pain Scale 4-6); if allergic/unable to tolerate oxycodone, or if acetaminophen ineffective).        Ibuprofen (Tab) MOTRIN 600 MG Take 1 Tab by mouth every 6 hours as needed (Cramping).        Nitrofurantoin Monohyd Macro (Cap) MACROBID 100 MG Take 1 Cap by mouth 2 times a day.        Polyethylene Glycol 3350 (Pack) MIRALAX  Take 1 Packet by mouth every day.        Prenatal Vit-Fe Fumarate-FA (Tab) PRENATAL S 27-0.8 MG Take 1 Tab by mouth every morning.        .                 Medicines prescribed today were sent to:     [facility]/PHARMACY #[id number] - [place] - [address]    [address] [place] [address]    Phone: [phone] Fax: [phone]    Open 24 Hours?: No      Medication refill instructions:       If your prescription bottle indicates you have medication refills left, it is not necessary to call your provider’s office. Please contact your pharmacy and they will refill your medication.    If your  prescription bottle indicates you do not have any refills left, you may request refills at any time through one of the following ways: The online [employer]er[employer] system (except Urgent Care), by calling your provider’s office, or by asking your pharmacy to contact your provider’s office with a refill request. Medication refills are processed only during regular business hours and may not be available until the next business day. Your provider may request additional information or to have a follow-up visit with you prior to refilling your medication.   *Please Note: Medication refills are assigned a new Rx number when refilled electronically. Your pharmacy may indicate that no refills were authorized even though a new prescription for the same medication is available at the pharmacy. Please request the medicine by name with the pharmacy before contacting your provider for a refill.           uberVU Access Code: [email or web address]  Expires: 5/7/2017 10:06 AM    uberVU  A secure, online tool to manage your health information     [employer]’s uberVU® is a secure, online tool that connects you to your personalized health information from the privacy of your home -- day or night - making it very easy for you to manage your healthcare. Once the activation process is completed, you can even access your medical information using the uberVU rhea, which is available for free in the Apple Rhea store or Google Play store.     uberVU provides the following levels of access (as shown below):   My Chart Features   [facility] Doctor [facility]  Specialists [facility]  Urgent  Care Non-[facility]  Primary Care  Doctor   Email your healthcare team securely and privately 24/7 X X X    Manage appointments: schedule your next appointment; view details of past/upcoming appointments X      Request prescription refills. X      View recent personal medical records, including lab and immunizations X X X X   View health record, including health  history, allergies, medications X X X X   Read reports about your outpatient visits, procedures, consult and ER notes X X X X   See your discharge summary, which is a recap of your hospital and/or ER visit that includes your diagnosis, lab results, and care plan. X X       How to register for [employer]:  1. Go to  [email or web address].  2. Click on the Sign Up Now box, which takes you to the New Member Sign Up page. You will need to provide the following information:  a. Enter your E[employer]m Access Code exactly as it appears at the top of this page. (You will not need to use this code after you’ve completed the sign-up process. If you do not sign up before the expiration date, you must request a new code.)   b. Enter your date of birth.   c. Enter your home email address.   d. Click Submit, and follow the next screen’s instructions.  3. Create a Econotherm ID. This will be your Econ[employer]m login ID and cannot be changed, so think of one that is secure and easy to remember.  4. Create a Prompt Associatest password. You can change your password at any time.  5. Enter your Password Reset Question and Answer. This can be used at a later time if you forget your password.   6. Enter your e-mail address. This allows you to receive e-mail notifications when new information is available in Econotherm.  7. Click Sign Up. You can now view your health information.    For assistance activating your [employer] account, call [phone]

## 2017-04-24 NOTE — PROGRESS NOTES
Subjective:      Amy Correa is a 31 y.o. female who presents with No chief complaint on file.    Past Medical History   Diagnosis Date   • Known health problems: none      Social History     Social History   • Marital Status:      Spouse Name: N/A   • Number of Children: N/A   • Years of Education: N/A     Occupational History   • Not on file.     Social History Main Topics   • Smoking status: Never Smoker    • Smokeless tobacco: Not on file   • Alcohol Use: No      Comment: occ   • Drug Use: No   • Sexual Activity: Not on file     Other Topics Concern   • Not on file     Social History Narrative     Family History   Problem Relation Age of Onset   • Diabetes Maternal Uncle    • Diabetes Maternal Grandmother      Allergies: Penicillins    This patient is a 31-year-old female who presents today with complaint of cough and sinus pain and pressure with bilateral earache. She states that this morning she began to expectorate green nasal drainage. Her cough has been dry and she does not feel tight in her chest at this time. She denies feeling short of breath.        Cough  This is a new problem. The current episode started in the past 7 days. The problem has been gradually worsening. The problem occurs every few minutes. The cough is productive of sputum. Associated symptoms include chills, ear congestion, ear pain, a fever, headaches, nasal congestion, postnasal drip, a sore throat and sweats. Pertinent negatives include no hemoptysis, shortness of breath or wheezing. Nothing aggravates the symptoms. She has tried OTC cough suppressant for the symptoms. The treatment provided no relief.       Review of Systems   Constitutional: Positive for fever, chills, malaise/fatigue and diaphoresis.   HENT: Positive for congestion, ear pain, postnasal drip and sore throat.         Sinus pain and pressure    Eyes: Negative.    Respiratory: Positive for cough. Negative for hemoptysis, sputum production, shortness of breath  and wheezing.    Cardiovascular: Negative.    Gastrointestinal: Negative.    Genitourinary: Negative.    Musculoskeletal: Negative.    Skin: Negative.    Neurological: Positive for headaches.       All other systems reviewed and are negative      Objective:     LMP 10/14/2013     Physical Exam   Constitutional: She is oriented to person, place, and time. She appears well-developed and well-nourished. No distress.   HENT:   Right Ear: Tympanic membrane and external ear normal.   Left Ear: Tympanic membrane and external ear normal.   Nose: Right sinus exhibits no maxillary sinus tenderness and no frontal sinus tenderness. Left sinus exhibits no maxillary sinus tenderness and no frontal sinus tenderness.   Mouth/Throat: Uvula is midline, oropharynx is clear and moist and mucous membranes are normal.   Tender over the maxillary sinuses  Yellow PND   Eyes: Conjunctivae and EOM are normal. Pupils are equal, round, and reactive to light.   Neck: Normal range of motion. Neck supple.   Cardiovascular: Regular rhythm and normal heart sounds.    Pulmonary/Chest: Effort normal.   Persistent  dry cough noted  Rhonchi that clear with cough    Musculoskeletal: Normal range of motion.   Neurological: She is alert and oriented to person, place, and time.   Skin: Skin is warm and dry. She is not diaphoretic.   Psychiatric: She has a normal mood and affect. Her behavior is normal.   Vitals reviewed.              Assessment/Plan:   Sinusitis  Cough   -doxycycline   -albuterol if needed; rx given   -cheratussin PRN cough; clearly stated no driving or ETOH with this medication   -follow up if symptoms perissto r worsen        There are no diagnoses linked to this encounter.

## 2017-06-21 ENCOUNTER — TELEPHONE (OUTPATIENT)
Dept: MEDICAL GROUP | Age: 32
End: 2017-06-21

## 2017-06-21 NOTE — TELEPHONE ENCOUNTER
NEW PATIENT VISIT PRE-VISIT PLANNING    1.  EpicCare Patient is checked in Patient Demographics? YES    2.  Immunizations were updated in Epic using WebIZ?: No WebIZ record       •  Web Iz Recommendations:     3.  Is this appointment scheduled as a Hospital Follow-Up? No    4.  Patient is due for the following Health Maintenance Topics:   Health Maintenance Due   Topic Date Due   • PAP SMEAR  01/31/2017           5.  Reviewed/Updated the following with patient:       •   Preferred Pharmacy? YES       •   Preferred Lab? YES       •   Medications? YES. Was Abstract Encounter opened and chart updated? YES       •   Social History? YES. Was Abstract Encounter opened and chart updated? YES       •   Family History? YES. Was Abstract Encounter opened and chart updated? YES    6.  Updated Care Team?       •   DME Company (gait device, O2, CPAP, etc.) NO       •   Other Specialists (eye doctor, derm, GYN, cardiology, endo, etc): NO    7.  Patient was informed to arrive 15 min prior to their scheduled appointment and bring in their medication bottles.

## 2017-06-26 ENCOUNTER — OFFICE VISIT (OUTPATIENT)
Dept: MEDICAL GROUP | Age: 32
End: 2017-06-26
Payer: COMMERCIAL

## 2017-06-26 ENCOUNTER — APPOINTMENT (OUTPATIENT)
Dept: LAB | Facility: MEDICAL CENTER | Age: 32
End: 2017-06-26
Payer: COMMERCIAL

## 2017-06-26 VITALS
WEIGHT: 193.4 LBS | DIASTOLIC BLOOD PRESSURE: 74 MMHG | TEMPERATURE: 98.6 F | HEIGHT: 61 IN | SYSTOLIC BLOOD PRESSURE: 120 MMHG | BODY MASS INDEX: 36.51 KG/M2 | OXYGEN SATURATION: 96 % | HEART RATE: 104 BPM

## 2017-06-26 DIAGNOSIS — E66.9 OBESITY (BMI 30-39.9): ICD-10-CM

## 2017-06-26 DIAGNOSIS — R25.2 CRAMP OF BOTH LOWER EXTREMITIES: ICD-10-CM

## 2017-06-26 DIAGNOSIS — Z00.00 PE (PHYSICAL EXAM), ANNUAL: Primary | ICD-10-CM

## 2017-06-26 PROCEDURE — 99395 PREV VISIT EST AGE 18-39: CPT | Performed by: FAMILY MEDICINE

## 2017-06-26 ASSESSMENT — PATIENT HEALTH QUESTIONNAIRE - PHQ9: CLINICAL INTERPRETATION OF PHQ2 SCORE: 0

## 2017-06-26 NOTE — PROGRESS NOTES
Subjective:   CC: establish care, leg cramp    HPI:     Amy Correa is a 31 y.o. female, established patient of the clinic, presents with the following concerns:     1. Obesity (BMI 30-39.9)  BMI 36.23, endorses worsening weight gain over the past few years, especially after pregnancy. Trying to lose weight with herbal medical (Garcina Cambogia) and exercise.   Pt is interested in dietary counseling, but does not want to be referred to [facility]/     2. Cramp of both lower extremities  Chronic, intermittent bilateral leg cramp at night. Symptoms were worse during pregnancy.   Pt thinks she does not drink enough water. She has no dietary restriction.     Current medicines (including changes today)  Current Outpatient Prescriptions   Medication Sig Dispense Refill   • GARCINIA CAMBOGIA-CHROMIUM PO Take  by mouth.       No current facility-administered medications for this visit.     She  has a past medical history of Known health problems: none. She also has no past medical history of Blood transfusion, Cancer (CMS-[facility]), CATARACT, ASTHMA, Arthritis, Arrhythmia, Anxiety, Anemia, Allergy, Addisons disease (CMS-[facility]), Adrenal disorder (CMS-[facility]), CHF (congestive heart failure) (CMS-[facility]), Clotting disorder (CMS-[facility]), COPD, Cushings syndrome (CMS-[facility]), Depression, Diabetes, Diabetic neuropathy (CMS-[facility]), EMPHYSEMA, GERD (gastroesophageal reflux disease), Glaucoma, Goiter, Muscle disorder, OSTEOPOROSIS, Pituitary disease (CMS-[facility]), Seizure (CMS-[facility]), Sickle cell disease (CMS-[facility]), Stroke (CMS-[facility]), Substance abuse, Thyroid disease, Tuberculosis, Ulcer (CMS-[facility]), or Urinary tract infection, site not specified.    I personally reviewed patient's problem list, allergies, medications, family hx, social hx with patient and update [facility].     REVIEW OF SYSTEMS:  CONSTITUTIONAL:  Denies night sweats, fatigue, malaise, lethargy, fever or chills.  RESPIRATORY:  Denies cough, wheeze, hemoptysis, or shortness of  "breath.  CARDIOVASCULAR:  Denies chest pains, palpitations, pedal edema  GASTROINTESTINAL:  Denies abdominal pain, nausea or vomiting, diarrhea, constipation, hematemesis, hematochezia, melena.  GENITOURINARY:  Denies urinary urgency, frequency, dysuria, or hematuria.       Objective:     Blood pressure 120/74, pulse 104, temperature 37 °C (98.6 °F), height 1.556 m (5' 1.26\"), weight 87.726 kg (193 lb 6.4 oz), last menstrual period 10/14/2013, SpO2 96 %. Body mass index is 36.23 kg/(m^2).    Physical Exam:  Constitutional: awake, alert, in no distress, obese.   Skin: Warm, dry, good turgor, no rashes, bruises, ulcers in visible areas.  Eye: conjunctiva clear, lids neg for edema or lesions.  Neck: Trachea midline, no masses, no thyromegaly. No cervical or supraclavicular lymphadenopathy  Respiratory: Unlabored respiratory effort, lungs clear to auscultation, no wheezes, no rhonchi.  Cardiovascular: Normal S1, S2, no murmur, no pedal edema.  Abdomen: Soft, non-tender to palpation, no hernia, no hepatosplenomegaly.  Neuro: CN2-12 grossly intact. Strength 5/5, reflexes 2/4 in all extremities, no sensory deficits.   Psych: Oriented x3, affect and mood wnl, intact judgement and insight.       Assessment and Plan:   The following treatment plan was discussed    1. Obesity (BMI 30-39.9)  - Patient identified as having weight management issue.  Appropriate orders and counseling given.  - dietary counseling  - offered referral to [facility] but pt declines.     2. Cramp of both lower extremities  Pt c/o of intermittent bilateral nocturnal leg cramp. Plan:   - hydration, multivitamin   - stretching    3. PE (physical exam), annual  - HEMOGLOBIN A1C; Future  - LIPID PROFILE; Future  - TSH WITH REFLEX TO FT4; Future  - VITAMIN D,25 HYDROXY; Future  - COMP METABOLIC PANEL; Future  - CBC WITH DIFFERENTIAL; Future      Ly MITCHEL Azul M.D.      Followup: Return in about 2 weeks (around 7/10/2017) for Pap.    Please note that this " dictation was created using voice recognition software. I have made every reasonable attempt to correct obvious errors, but I expect that there are errors of grammar and possibly content that I did not discover before finalizing the note.

## 2017-06-26 NOTE — MR AVS SNAPSHOT
"        Amy Correa   2017 1:20 PM   Office Visit   MRN: [id number]    Department:  [address] [place] [address]   Dept Phone:  [phone]    Description:  Female : 1985   Provider:  Sweta Azul M.D.           Reason for Visit     Establish Care           Allergies as of 2017     Allergen Noted Reactions    Penicillins 2012   Rash      You were diagnosed with     Obesity (BMI 30-39.9)   [323058]       Cramp of both lower extremities   [2619366]       PE (physical exam), annual   [010721]         Vital Signs     Blood Pressure Pulse Temperature Height Weight Body Mass Index    120/74 mmHg 104 37 °C (98.6 °F) 1.556 m (5' 1.26\") 87.726 kg (193 lb 6.4 oz) 36.23 kg/m2    Oxygen Saturation Last Menstrual Period Smoking Status             96% 10/14/2013 Never Smoker          Basic Information     Date Of Birth Sex Race Ethnicity Preferred Language    1985 Female  or   Origin (Montserratian,South African,Cambodian,Felix, etc) English      Your appointments     2017  1:20 PM   Established Patient with Sweta Azul M.D.   [address] [address] [phone]           You will be receiving a confirmation call a few days before your appointment from our automated call confirmation system.              Problem List              ICD-10-CM Priority Class Noted - Resolved    Family planning education, guidance, and counseling Z30.09   2014 - Present    Obesity (BMI 30-39.9) E66.9   2017 - Present      Health Maintenance        Date Due Completion Dates    PAP SMEAR 2017    IMM DTaP/Tdap/Td Vaccine (2 - Td) 6/3/2024 6/3/2014            Current Immunizations     Tdap Vaccine 6/3/2014      Below and/or attached are the medications your provider expects you to take. Review all of your home medications and newly ordered medications with your provider and/or pharmacist. Follow medication instructions as " directed by your provider and/or pharmacist. Please keep your medication list with you and share with your provider. Update the information when medications are discontinued, doses are changed, or new medications (including over-the-counter products) are added; and carry medication information at all times in the event of emergency situations     Allergies:  PENICILLINS - Rash               Medications  Valid as of: June 26, 2017 -  2:07 PM    Generic Name Brand Name Tablet Size Instructions for use    Garctereza Laogia-Chromium   Take  by mouth.        .                 Medicines prescribed today were sent to:     [id number] - [place], [place] - [address]    [address] [place] [address]    Phone: [phone] Fax: [phone]    Open 24 Hours?: No      Medication refill instructions:       If your prescription bottle indicates you have medication refills left, it is not necessary to call your provider’s office. Please contact your pharmacy and they will refill your medication.    If your prescription bottle indicates you do not have any refills left, you may request refills at any time through one of the following ways: The online [employer] system (except Urgent Care), by calling your provider’s office, or by asking your pharmacy to contact your provider’s office with a refill request. Medication refills are processed only during regular business hours and may not be available until the next business day. Your provider may request additional information or to have a follow-up visit with you prior to refilling your medication.   *Please Note: Medication refills are assigned a new Rx number when refilled electronically. Your pharmacy may indicate that no refills were authorized even though a new prescription for the same medication is available at the pharmacy. Please request the medicine by name with the pharmacy before contacting your provider for a refill.        Your To Do List     Future  Labs/Procedures Complete By Expires    CBC WITH DIFFERENTIAL  As directed 6/27/2018    COMP METABOLIC PANEL  As directed 6/27/2018    HEMOGLOBIN A1C  As directed 6/27/2018    LIPID PROFILE  As directed 6/27/2018    TSH WITH REFLEX TO FT4  As directed 6/26/2018    VITAMIN D,25 HYDROXY  As directed 6/27/2018         NGI Access Code: [email or web address]  Expires: 7/4/2017  4:05 AM    NGI  A secure, online tool to manage your health information     [employer]’s NGI® is a secure, online tool that connects you to your personalized health information from the privacy of your home -- day or night - making it very easy for you to manage your healthcare. Once the activation process is completed, you can even access your medical information using the NGI rhea, which is available for free in the Apple Rhea store or Google Play store.     NGI provides the following levels of access (as shown below):   My Chart Features   [facility] Doctor [facility]  Specialists [facility]  Urgent  Care [facility]  Primary Care  Doctor   Email your healthcare team securely and privately 24/7 X X X    Manage appointments: schedule your next appointment; view details of past/upcoming appointments X      Request prescription refills. X      View recent personal medical records, including lab and immunizations X X X X   View health record, including health history, allergies, medications X X X X   Read reports about your outpatient visits, procedures, consult and ER notes X X X X   See your discharge summary, which is a recap of your hospital and/or ER visit that includes your diagnosis, lab results, and care plan. X X       How to register for NGI:  1. Go to  [email or web address].  2. Click on the Sign Up Now box, which takes you to the New Member Sign Up page. You will need to provide the following information:  a. Enter your NGI Access Code exactly as it appears at the top of this page. (You will not need to use  this code after you’ve completed the sign-up process. If you do not sign up before the expiration date, you must request a new code.)   b. Enter your date of birth.   c. Enter your home email address.   d. Click Submit, and follow the next screen’s instructions.  3. Create a [employer]ig[employer] ID. This will be your [employer] login ID and cannot be changed, so think of one that is secure and easy to remember.  4. Create a [employer] password. You can change your password at any time.  5. Enter your Password Reset Question and Answer. This can be used at a later time if you forget your password.   6. Enter your e-mail address. This allows you to receive e-mail notifications when new information is available in [employer].  7. Click Sign Up. You can now view your health information.    For assistance activating your [employer] account, call [phone]

## 2017-06-28 ENCOUNTER — HOSPITAL ENCOUNTER (OUTPATIENT)
Dept: LAB | Facility: MEDICAL CENTER | Age: 32
End: 2017-06-28
Attending: FAMILY MEDICINE
Payer: COMMERCIAL

## 2017-06-28 DIAGNOSIS — Z00.00 PE (PHYSICAL EXAM), ANNUAL: ICD-10-CM

## 2017-06-28 LAB
25(OH)D3 SERPL-MCNC: 30 NG/ML (ref 30–100)
ALBUMIN SERPL BCP-MCNC: 4 G/DL (ref 3.2–4.9)
ALBUMIN/GLOB SERPL: 1.1 G/DL
ALP SERPL-CCNC: 54 U/L (ref 30–99)
ALT SERPL-CCNC: 11 U/L (ref 2–50)
ANION GAP SERPL CALC-SCNC: 8 MMOL/L (ref 0–11.9)
ANISOCYTOSIS BLD QL SMEAR: ABNORMAL
AST SERPL-CCNC: 24 U/L (ref 12–45)
BASOPHILS # BLD AUTO: 0.4 % (ref 0–1.8)
BASOPHILS # BLD: 0.03 K/UL (ref 0–0.12)
BILIRUB SERPL-MCNC: 0.7 MG/DL (ref 0.1–1.5)
BUN SERPL-MCNC: 14 MG/DL (ref 8–22)
CALCIUM SERPL-MCNC: 8.7 MG/DL (ref 8.5–10.5)
CHLORIDE SERPL-SCNC: 109 MMOL/L (ref 96–112)
CHOLEST SERPL-MCNC: 119 MG/DL (ref 100–199)
CO2 SERPL-SCNC: 20 MMOL/L (ref 20–33)
COMMENT 1642: NORMAL
CREAT SERPL-MCNC: 0.58 MG/DL (ref 0.5–1.4)
EOSINOPHIL # BLD AUTO: 0.14 K/UL (ref 0–0.51)
EOSINOPHIL NFR BLD: 1.9 % (ref 0–6.9)
ERYTHROCYTE [DISTWIDTH] IN BLOOD BY AUTOMATED COUNT: 51.1 FL (ref 35.9–50)
EST. AVERAGE GLUCOSE BLD GHB EST-MCNC: 97 MG/DL
GFR SERPL CREATININE-BSD FRML MDRD: >60 ML/MIN/1.73 M 2
GLOBULIN SER CALC-MCNC: 3.6 G/DL (ref 1.9–3.5)
GLUCOSE SERPL-MCNC: 67 MG/DL (ref 65–99)
HBA1C MFR BLD: 5 % (ref 0–5.6)
HCT VFR BLD AUTO: 32.3 % (ref 37–47)
HDLC SERPL-MCNC: 41 MG/DL
HGB BLD-MCNC: 9.2 G/DL (ref 12–16)
IMM GRANULOCYTES # BLD AUTO: 0.01 K/UL (ref 0–0.11)
IMM GRANULOCYTES NFR BLD AUTO: 0.1 % (ref 0–0.9)
LDLC SERPL CALC-MCNC: 68 MG/DL
LYMPHOCYTES # BLD AUTO: 1.7 K/UL (ref 1–4.8)
LYMPHOCYTES NFR BLD: 23.6 % (ref 22–41)
MCH RBC QN AUTO: 20.3 PG (ref 27–33)
MCHC RBC AUTO-ENTMCNC: 28.5 G/DL (ref 33.6–35)
MCV RBC AUTO: 71.3 FL (ref 81.4–97.8)
MICROCYTES BLD QL SMEAR: ABNORMAL
MONOCYTES # BLD AUTO: 0.55 K/UL (ref 0–0.85)
MONOCYTES NFR BLD AUTO: 7.6 % (ref 0–13.4)
MORPHOLOGY BLD-IMP: NORMAL
NEUTROPHILS # BLD AUTO: 4.76 K/UL (ref 2–7.15)
NEUTROPHILS NFR BLD: 66.4 % (ref 44–72)
NRBC # BLD AUTO: 0 K/UL
NRBC BLD AUTO-RTO: 0 /100 WBC
OVALOCYTES BLD QL SMEAR: NORMAL
PLATELET # BLD AUTO: 368 K/UL (ref 164–446)
PLATELET BLD QL SMEAR: NORMAL
PMV BLD AUTO: 10 FL (ref 9–12.9)
POIKILOCYTOSIS BLD QL SMEAR: NORMAL
POTASSIUM SERPL-SCNC: 4 MMOL/L (ref 3.6–5.5)
PROT SERPL-MCNC: 7.6 G/DL (ref 6–8.2)
RBC # BLD AUTO: 4.53 M/UL (ref 4.2–5.4)
RBC BLD AUTO: PRESENT
SODIUM SERPL-SCNC: 137 MMOL/L (ref 135–145)
T4 FREE SERPL-MCNC: 0.87 NG/DL (ref 0.53–1.43)
TRIGL SERPL-MCNC: 52 MG/DL (ref 0–149)
TSH SERPL DL<=0.005 MIU/L-ACNC: 5.08 UIU/ML (ref 0.3–3.7)
WBC # BLD AUTO: 7.2 K/UL (ref 4.8–10.8)

## 2017-06-28 PROCEDURE — 84443 ASSAY THYROID STIM HORMONE: CPT

## 2017-06-28 PROCEDURE — 80061 LIPID PANEL: CPT

## 2017-06-28 PROCEDURE — 80053 COMPREHEN METABOLIC PANEL: CPT

## 2017-06-28 PROCEDURE — 82306 VITAMIN D 25 HYDROXY: CPT

## 2017-06-28 PROCEDURE — 83036 HEMOGLOBIN GLYCOSYLATED A1C: CPT

## 2017-06-28 PROCEDURE — 85025 COMPLETE CBC W/AUTO DIFF WBC: CPT

## 2017-06-28 PROCEDURE — 36415 COLL VENOUS BLD VENIPUNCTURE: CPT

## 2017-06-28 PROCEDURE — 84439 ASSAY OF FREE THYROXINE: CPT

## 2017-06-29 ENCOUNTER — TELEPHONE (OUTPATIENT)
Dept: MEDICAL GROUP | Age: 32
End: 2017-06-29

## 2017-06-29 PROBLEM — D50.9 MICROCYTIC ANEMIA: Status: ACTIVE | Noted: 2017-06-29

## 2017-06-29 PROBLEM — E03.8 SUBCLINICAL HYPOTHYROIDISM: Status: ACTIVE | Noted: 2017-06-29

## 2017-06-29 NOTE — TELEPHONE ENCOUNTER
----- Message from Sweta Azul M.D. sent at 6/29/2017  8:12 AM PDT -----  Please call pt & inform the following:   I reviewed the results of your recent blood tests.   There are couple issues that need to be addressed. Please be sure to keep follow up appointment with me on 7/7/2017 to discuss laboratory findings.   Thanks.   Sweta Azul M.D.

## 2017-06-29 NOTE — TELEPHONE ENCOUNTER
Phone Number Called: [phone] (home)     Message: Called Pt and notified of message, pt understood and will follow up with PCP    Left Message for patient to call back: N\A

## 2017-07-07 ENCOUNTER — HOSPITAL ENCOUNTER (OUTPATIENT)
Facility: MEDICAL CENTER | Age: 32
End: 2017-07-07
Attending: FAMILY MEDICINE
Payer: COMMERCIAL

## 2017-07-07 ENCOUNTER — HOSPITAL ENCOUNTER (OUTPATIENT)
Dept: LAB | Facility: MEDICAL CENTER | Age: 32
End: 2017-07-07
Attending: FAMILY MEDICINE
Payer: COMMERCIAL

## 2017-07-07 ENCOUNTER — OFFICE VISIT (OUTPATIENT)
Dept: MEDICAL GROUP | Age: 32
End: 2017-07-07
Payer: COMMERCIAL

## 2017-07-07 VITALS
HEIGHT: 61 IN | TEMPERATURE: 97.7 F | OXYGEN SATURATION: 100 % | DIASTOLIC BLOOD PRESSURE: 74 MMHG | WEIGHT: 187.6 LBS | HEART RATE: 98 BPM | SYSTOLIC BLOOD PRESSURE: 110 MMHG | BODY MASS INDEX: 35.42 KG/M2

## 2017-07-07 DIAGNOSIS — Z11.51 SPECIAL SCREENING EXAMINATION FOR HUMAN PAPILLOMAVIRUS (HPV): ICD-10-CM

## 2017-07-07 DIAGNOSIS — Z12.4 ROUTINE CERVICAL SMEAR: ICD-10-CM

## 2017-07-07 DIAGNOSIS — Z01.419 PAP SMEAR, LOW-RISK: Primary | ICD-10-CM

## 2017-07-07 DIAGNOSIS — D50.9 MICROCYTIC ANEMIA: ICD-10-CM

## 2017-07-07 DIAGNOSIS — Z01.419 PAP SMEAR, LOW-RISK: ICD-10-CM

## 2017-07-07 DIAGNOSIS — N63.10 BREAST MASS, RIGHT: ICD-10-CM

## 2017-07-07 DIAGNOSIS — E03.8 SUBCLINICAL HYPOTHYROIDISM: ICD-10-CM

## 2017-07-07 LAB
FERRITIN SERPL-MCNC: 3.8 NG/ML (ref 10–291)
IRON SATN MFR SERPL: 2 % (ref 15–55)
IRON SERPL-MCNC: 11 UG/DL (ref 40–170)
TIBC SERPL-MCNC: 441 UG/DL (ref 250–450)

## 2017-07-07 PROCEDURE — 99000 SPECIMEN HANDLING OFFICE-LAB: CPT | Performed by: FAMILY MEDICINE

## 2017-07-07 PROCEDURE — 99395 PREV VISIT EST AGE 18-39: CPT | Performed by: FAMILY MEDICINE

## 2017-07-07 PROCEDURE — 36415 COLL VENOUS BLD VENIPUNCTURE: CPT

## 2017-07-07 PROCEDURE — 87624 HPV HI-RISK TYP POOLED RSLT: CPT

## 2017-07-07 PROCEDURE — 83540 ASSAY OF IRON: CPT

## 2017-07-07 PROCEDURE — 82728 ASSAY OF FERRITIN: CPT

## 2017-07-07 PROCEDURE — 83550 IRON BINDING TEST: CPT

## 2017-07-07 PROCEDURE — 88175 CYTOPATH C/V AUTO FLUID REDO: CPT

## 2017-07-07 RX ORDER — LANOLIN ALCOHOL/MO/W.PET/CERES
325 CREAM (GRAM) TOPICAL
Qty: 90 TAB | Refills: 3 | Status: SHIPPED | OUTPATIENT
Start: 2017-07-07 | End: 2017-08-09

## 2017-07-07 NOTE — MR AVS SNAPSHOT
"        Amy Correa   2017 1:20 PM   Office Visit   MRN: [id number]    Department:  [address] [place] [address]   Dept Phone:  [phone]    Description:  Female : 1985   Provider:  Sweta Azul M.D.           Reason for Visit     Thyroid Problem lab review - PAP      Allergies as of 2017     Allergen Noted Reactions    Penicillins 2012   Rash      You were diagnosed with     Pap smear, low-risk   [795849]  -  Primary     Microcytic anemia   [377859]       Subclinical hypothyroidism   [001217]       Routine cervical smear   [238173]       Special screening examination for human papillomavirus (HPV)   [V73.81.ICD-9-CM]       Breast mass, right   [461900]         Vital Signs     Blood Pressure Pulse Temperature Height Weight Body Mass Index    110/74 mmHg 98 36.5 °C (97.7 °F) 1.556 m (5' 1.26\") 85.095 kg (187 lb 9.6 oz) 35.15 kg/m2    Oxygen Saturation Last Menstrual Period Smoking Status             100% 10/14/2013 Never Smoker          Basic Information     Date Of Birth Sex Race Ethnicity Preferred Language    1985 Female  or   Origin (Swedish,Jamaican,Liberian,Felix, etc) English      Problem List              ICD-10-CM Priority Class Noted - Resolved    Obesity (BMI 30-39.9) E66.9   2017 - Present    Microcytic anemia D50.9   2017 - Present    Subclinical hypothyroidism E03.9   2017 - Present      Health Maintenance        Date Due Completion Dates    PAP SMEAR 2017    IMM INFLUENZA (1) 2017 ---    IMM DTaP/Tdap/Td Vaccine (2 - Td) 6/3/2024 6/3/2014            Current Immunizations     Tdap Vaccine 6/3/2014      Below and/or attached are the medications your provider expects you to take. Review all of your home medications and newly ordered medications with your provider and/or pharmacist. Follow medication instructions as directed by your provider and/or pharmacist. Please keep your medication list with you and share with your " provider. Update the information when medications are discontinued, doses are changed, or new medications (including over-the-counter products) are added; and carry medication information at all times in the event of emergency situations     Allergies:  PENICILLINS - Rash               Medications  Valid as of: July 07, 2017 -  2:28 PM    Generic Name Brand Name Tablet Size Instructions for use    Ferrous Sulfate (Tablet Delayed Response) ferrous sulfate 325 (65 FE) MG Take 1 Tab by mouth 3 times a day, with meals.        Garcinia Cambogia-Chromium   Take  by mouth.        .                 Medicines prescribed today were sent to:     [facility] #[id number] - [place], [place] - [address]    [address] [place] [address]    Phone: [phone] Fax: [phone]    Open 24 Hours?: No      Medication refill instructions:       If your prescription bottle indicates you have medication refills left, it is not necessary to call your provider’s office. Please contact your pharmacy and they will refill your medication.    If your prescription bottle indicates you do not have any refills left, you may request refills at any time through one of the following ways: The online [employer] system (except Urgent Care), by calling your provider’s office, or by asking your pharmacy to contact your provider’s office with a refill request. Medication refills are processed only during regular business hours and may not be available until the next business day. Your provider may request additional information or to have a follow-up visit with you prior to refilling your medication.   *Please Note: Medication refills are assigned a new Rx number when refilled electronically. Your pharmacy may indicate that no refills were authorized even though a new prescription for the same medication is available at the pharmacy. Please request the medicine by name with the pharmacy before contacting your provider for a refill.        Your To Do  List     Future Labs/Procedures Complete By Expires    CBC WITH DIFFERENTIAL  10/7/2017 7/8/2018    FERRITIN  As directed 7/7/2018    IRON/TOTAL IRON BIND  As directed 7/8/2018    THINPREP PAP WITH HPV  As directed 7/8/2018         MyChart Access Code: Activation code not generated  Current MyChart Status: Active

## 2017-07-07 NOTE — PROGRESS NOTES
Subjective:   CC: well women exam, pap    HPI:     Amy Correa is a 31 y.o. female, established patient of the clinic, presents with the following concerns:     , sexually active with .   Contraception: condoms  Hx of STD: no  Hx of abnormal pap: no  LMP: 2 weeks ago, regular, heavy flow for 2 days, last 5 days total, minimal cramping.   Denies fever, chills, pelvic pain, dyspareunia, abnormal vaginal bleeding/discharge, genital rash.     Denies nipple bleeding, discharge, breast mass, familial/personal hx of breast/gyn malignancy.     Current medicines (including changes today)  Current Outpatient Prescriptions   Medication Sig Dispense Refill   • ferrous sulfate 325 (65 FE) MG EC tablet Take 1 Tab by mouth 3 times a day, with meals. 90 Tab 3   • GARCINIA CAMBOGIA-CHROMIUM PO Take  by mouth.       No current facility-administered medications for this visit.     She  has a past medical history of Known health problems: none; Microcytic anemia (2017); and Subclinical hypothyroidism (2017). She also has no past medical history of Blood transfusion, Cancer (CMS-[facility]), CATARACT, ASTHMA, Arthritis, Arrhythmia, Anxiety, Allergy, Addisons disease (CMS-[facility]), Adrenal disorder (CMS-[facility]), CHF (congestive heart failure) (CMS-HCC), Clotting disorder (CMS-[facility]), COPD, Cushings syndrome (CMS-[facility]), Depression, Diabetes, Diabetic neuropathy (CMS-[facility]), EMPHYSEMA, GERD (gastroesophageal reflux disease), Glaucoma, Goiter, Muscle disorder, OSTEOPOROSIS, Pituitary disease (CMS-[facility]), Seizure (CMS-[facility]), Sickle cell disease (CMS-[facility]), Stroke (CMS-HCC), Substance abuse, Tuberculosis, Ulcer (CMS-[facility]), or Urinary tract infection, site not specified.    I personally reviewed patient's problem list, allergies, medications, family hx, social hx with patient and update EPIC.     REVIEW OF SYSTEMS:  CONSTITUTIONAL:  Denies night sweats, fatigue, malaise, lethargy, fever or chills.  RESPIRATORY:  Denies cough, wheeze,  "hemoptysis, or shortness of breath.  CARDIOVASCULAR:  Denies chest pains, palpitations, pedal edema  GASTROINTESTINAL:  Denies abdominal pain, nausea or vomiting, diarrhea, constipation, hematemesis, hematochezia, melena.  GENITOURINARY:  Denies urinary urgency, frequency, dysuria, or hematuria.        Objective:     Blood pressure 110/74, pulse 98, temperature 36.5 °C (97.7 °F), height 1.556 m (5' 1.26\"), weight 85.095 kg (187 lb 9.6 oz), last menstrual period 10/14/2013, SpO2 100 %. Body mass index is 35.15 kg/(m^2).    Physical Exam:  Constitutional: awake, alert, in no distress.  Skin: Warm, dry, good turgor, pale, no rashes, bruises, ulcers in visible areas.  Neck: Trachea midline, no masses, no thyromegaly. No cervical or supraclavicular lymphadenopathy  Respiratory: Unlabored respiratory effort, lungs clear to auscultation, no wheezes, no rhonchi.  Cardiovascular: Normal S1, S2, no murmur, no pedal edema.  Abdomen: Soft, non-tender to palpation, no hernia, no hepatosplenomegaly.  Neuro: CN2-12 grossly intact. Strength 5/5, reflexes 2/4 in all extremities, no sensory deficits.   Psych: Oriented x3, affect and mood wnl, intact judgement and insight.   Breast: No nipple bleeding or discharge, neg nipples inversion, no breast skin changes, neg axillary and supraclavicular lymphadenopathy. 3x1 cm, firm, mobile, non-tender breat mass noted at the 5 o'clock position of the right breast.   GYN: Unremarkable external genitalia. Negative abnormal vaginal discharge or bleeding, no vaginal rash, cervix in mid position, no concerning lesions on cervix, no cervical motion tenderness, uterus mid position with normal size & shape, no adnexal fullness/mass appreciated on bimanual exam.    Assessment and Plan:   The following treatment plan was discussed    1. Microcytic anemia  Recent blood test noted for microcytic anemia. Denies hx of GI bleed. Period is regular, once per month, usually last 5 days with the first 3 days " being the heaviest.   On heavy days, she changes pad once every 3-4 hours. Sometimes, she has 2 periods per month, but always at least 20-25 days apart.   Denies personal or familial hx of hematologic disorder. It appears that she has anemia in 2014 as well.   ROS non-revealing. Pt is asymptomatic. The cause of anemia is unclear, likely secondary to blood loss with menstruation. Plan:   - ferrous sulfate 325 (65 FE) MG EC tablet; Take 1 Tab by mouth 3 times a day, with meals.  Dispense: 90 Tab; Refill: 3  - IRON/TOTAL IRON BIND; Future  - FERRITIN; Future  - CBC WITH DIFFERENTIAL; in 3 months  - Might need Mirena or depo-provera if anemia persists.   - s/s of severe, life-threatening blood loss discussed with patient. Advised her to seek immediate medical attention if she experiences symptoms.     2. Subclinical hypothyroidism  Recent blood test noted for subclinical hypothyroidism. Endorses poor energy, but likely secondary to microcytic anemia as above.   Will monitor.     3. Pap smear, low-risk  - THINPREP PAP WITH HPV; Future    4. Breast mass, right  Breast exam noted for 3x1 cm, firm, mobile, non-tender breat mass noted at the 5 o'clock position of the right breast.   Pt denies personal or familial hx of breast or gyn cancer. Plan:   - MA-DIAGNOSTIC MAMMO W/CAD-BILAT      Sweta Azul M.D.      Followup: Return in about 3 months (around 10/7/2017) for Short.    Please note that this dictation was created using voice recognition software. I have made every reasonable attempt to correct obvious errors, but I expect that there are errors of grammar and possibly content that I did not discover before finalizing the note.

## 2017-07-08 LAB
CYTOLOGY REG CYTOL: NORMAL
HPV HR 12 DNA CVX QL NAA+PROBE: NEGATIVE
HPV16 DNA SPEC QL NAA+PROBE: NEGATIVE
HPV18 DNA SPEC QL NAA+PROBE: NEGATIVE
SPECIMEN SOURCE: NORMAL

## 2017-08-08 ENCOUNTER — TELEPHONE (OUTPATIENT)
Dept: OBGYN | Facility: MEDICAL CENTER | Age: 32
End: 2017-08-08

## 2017-08-08 NOTE — TELEPHONE ENCOUNTER
CALL FROM PT STATE THAT SHE IS PREGNANT LMP 5/27/17  AND HAS STARTED HAVING SOME VAGINAL BLEEDING WITH SOME CRAMPING THIS MORNING. PT STATES THAT CRAMPING IS STARTING TO TAPER OFF AND BLEEDING IS BRIGHT RED AND SHE STATES THAT IF ITS LIKE HER PERIOD ABOUT TO START. PT STATES THAT SHE HAS HAS INTERCOURSE IN THE LAST 24 HOURS. INFORMED PT THAT THE BLEEDING CAN BE FROM INTERCOURSE BUT WONT REALLY KNOW UNTIL SHE IS EVALUATED.  ADVISED PT THAT IF CRAMPING AND BLEED SHOULD INCREASE OR IF SHE IS CONCERNED THEN  SHE NEED TO BE EVALUATED AT THE ER. PT STATES THAT SHE VERBALLY UNDERSTANDS.

## 2017-08-09 ENCOUNTER — HOSPITAL ENCOUNTER (EMERGENCY)
Facility: MEDICAL CENTER | Age: 32
End: 2017-08-09
Attending: EMERGENCY MEDICINE
Payer: COMMERCIAL

## 2017-08-09 ENCOUNTER — APPOINTMENT (OUTPATIENT)
Dept: RADIOLOGY | Facility: MEDICAL CENTER | Age: 32
End: 2017-08-09
Attending: EMERGENCY MEDICINE
Payer: COMMERCIAL

## 2017-08-09 VITALS
HEIGHT: 61 IN | WEIGHT: 191.8 LBS | HEART RATE: 100 BPM | DIASTOLIC BLOOD PRESSURE: 75 MMHG | BODY MASS INDEX: 36.21 KG/M2 | RESPIRATION RATE: 16 BRPM | SYSTOLIC BLOOD PRESSURE: 115 MMHG | OXYGEN SATURATION: 100 % | TEMPERATURE: 96.8 F

## 2017-08-09 DIAGNOSIS — N83.201 RIGHT OVARIAN CYST: ICD-10-CM

## 2017-08-09 DIAGNOSIS — O03.9 COMPLETE SPONTANEOUS ABORTION WITHOUT MENTION OF COMPLICATION: ICD-10-CM

## 2017-08-09 LAB
APPEARANCE UR: ABNORMAL
B-HCG SERPL-ACNC: 1721 MIU/ML (ref 0–10)
BILIRUB UR QL STRIP.AUTO: NEGATIVE
COLOR UR: ABNORMAL
CULTURE IF INDICATED INDCX: NO UA CULTURE
EPI CELLS #/AREA URNS HPF: ABNORMAL /HPF
GLUCOSE UR STRIP.AUTO-MCNC: NEGATIVE MG/DL
HCG UR QL: POSITIVE
HCT VFR BLD AUTO: 38.3 % (ref 37–47)
HGB BLD-MCNC: 12.2 G/DL (ref 12–16)
KETONES UR STRIP.AUTO-MCNC: NEGATIVE MG/DL
LEUKOCYTE ESTERASE UR QL STRIP.AUTO: NEGATIVE
MCH RBC QN AUTO: 25.5 PG (ref 27–33)
MCHC RBC AUTO-ENTMCNC: 31.9 G/DL (ref 33.6–35)
MCV RBC AUTO: 80 FL (ref 81.4–97.8)
MICRO URNS: ABNORMAL
MUCOUS THREADS #/AREA URNS HPF: ABNORMAL /HPF
NITRITE UR QL STRIP.AUTO: NEGATIVE
NUMBER OF RH DOSES IND 8505RD: NORMAL
PH UR STRIP.AUTO: 7 [PH]
PLATELET # BLD AUTO: 281 K/UL (ref 164–446)
PMV BLD AUTO: 9.9 FL (ref 9–12.9)
PROT UR QL STRIP: 100 MG/DL
RBC # BLD AUTO: 4.79 M/UL (ref 4.2–5.4)
RBC # URNS HPF: >150 /HPF
RBC UR QL AUTO: ABNORMAL
RH BLD: NORMAL
SP GR UR STRIP.AUTO: 1.02
WBC # BLD AUTO: 11.8 K/UL (ref 4.8–10.8)
WBC #/AREA URNS HPF: ABNORMAL /HPF

## 2017-08-09 PROCEDURE — 85027 COMPLETE CBC AUTOMATED: CPT

## 2017-08-09 PROCEDURE — 99284 EMERGENCY DEPT VISIT MOD MDM: CPT

## 2017-08-09 PROCEDURE — 84702 CHORIONIC GONADOTROPIN TEST: CPT

## 2017-08-09 PROCEDURE — 81001 URINALYSIS AUTO W/SCOPE: CPT

## 2017-08-09 PROCEDURE — 36415 COLL VENOUS BLD VENIPUNCTURE: CPT

## 2017-08-09 PROCEDURE — 76817 TRANSVAGINAL US OBSTETRIC: CPT

## 2017-08-09 PROCEDURE — 86901 BLOOD TYPING SEROLOGIC RH(D): CPT

## 2017-08-09 PROCEDURE — 81025 URINE PREGNANCY TEST: CPT

## 2017-08-09 RX ORDER — HYDROCODONE BITARTRATE AND ACETAMINOPHEN 5; 325 MG/1; MG/1
1-2 TABLET ORAL EVERY 4 HOURS PRN
Qty: 20 TAB | Refills: 0 | Status: SHIPPED | OUTPATIENT
Start: 2017-08-09 | End: 2018-02-05

## 2017-08-09 RX ORDER — M-VIT,TX,IRON,MINS/CALC/FOLIC 27MG-0.4MG
1 TABLET ORAL DAILY
Status: SHIPPED | COMMUNITY
End: 2018-02-06

## 2017-08-09 ASSESSMENT — PAIN SCALES - GENERAL
PAINLEVEL_OUTOF10: 2
PAINLEVEL_OUTOF10: 2
PAINLEVEL_OUTOF10: 6

## 2017-08-09 NOTE — ED NOTES
Patient complains of heavy vaginal bleeding since yesterday. Patient states she has used one large pad today. Patient had positive home pregnancy test and last mesutral period was 17. Patient has history .

## 2017-08-09 NOTE — ED AVS SNAPSHOT
8/9/2017    Amy Correa  [address] [address]    Dear Amy:    [facility] wants to ensure your discharge home is safe and you or your loved ones have had all of your questions answered regarding your care after you leave the hospital.    Below is a list of resources and contact information should you have any questions regarding your hospital stay, follow-up instructions, or active medical symptoms.    Questions or Concerns Regarding… Contact   Medical Questions Related to Your Discharge  (7 days a week, 8am-5pm) Contact a Nurse Care Coordinator   [phone]   Medical Questions Not Related to Your Discharge  (24 hours a day / 7 days a week)  Contact the Nurse Health Line   [phone]    Medications or Discharge Instructions Refer to your discharge packet   or contact your [facility] Primary Care Provider   [phone]   Follow-up Appointment(s) Schedule your appointment via [employer]   or contact Scheduling [phone]   Billing Review your statement via [employer]  or contact Billing [phone]   Medical Records Review your records via [employer]   or contact Medical Records [phone]     You may receive a telephone call within two days of discharge. This call is to make certain you understand your discharge instructions and have the opportunity to have any questions answered. You can also easily access your medical information, test results and upcoming appointments via the [employer] free online health management tool. You can learn more and sign up at [email or web address]. For assistance setting up your [employer] account, please call [phone].    Once again, we want to ensure your discharge home is safe and that you have a clear understanding of any next steps in your care. If you have any questions or concerns, please do not hesitate to contact us, we are here for you. Thank you for choosing [facility] for your healthcare needs.    Sincerely,    Your [facility] Healthcare Team

## 2017-08-09 NOTE — ED AVS SNAPSHOT
Home Care Instructions                                                                                                                Amy Correa   MRN: [id number]    Department:  [facility], Emergency Dept   Date of Visit:  2017            [facility], Emergency Dept    [address] [place] [address]    Phone:  [phone]      You were seen by     Valentine Cody M.D.      Your Diagnosis Was     Complete spontaneous  without mention of complication     O03.9       Follow-up Information     1. Follow up with Amy Renae M.D.. Call in 1 day.    Specialty:  OB/Gyn    Why:  for recheck within the next few days    Contact information    [address]  [place] [address] [phone]          2. Follow up with [facility], Emergency Dept.    Specialty:  Emergency Medicine    Why:  As needed, If symptoms worsen    Contact information    [address]  [place] [address] [phone]      Medication Information     Review all of your home medications and newly ordered medications with your primary doctor and/or pharmacist as soon as possible. Follow medication instructions as directed by your doctor and/or pharmacist.     Please keep your complete medication list with you and share with your physician. Update the information when medications are discontinued, doses are changed, or new medications (including over-the-counter products) are added; and carry medication information at all times in the event of emergency situations.               Medication List      START taking these medications        Instructions    Morning Afternoon Evening Bedtime    hydrocodone-acetaminophen 5-325 MG Tabs per tablet   Commonly known as:  NORCO        Take 1-2 Tabs by mouth every four hours as needed.   Dose:  1-2 Tab                          ASK your doctor about these medications        Instructions    Morning Afternoon Evening Bedtime    therapeutic multivitamin-minerals Tabs        Take 1 Tab by mouth every day.   Dose:  1 Tab                             Where to Get Your Medications      You can get these medications from any pharmacy     Bring a paper prescription for each of these medications    - hydrocodone-acetaminophen 5-325 MG Tabs per tablet            Procedures and tests performed during your visit     CARDIAC MONITORING    CBC WITHOUT DIFFERENTIAL    HCG QUANTITATIVE SERUM    O2 Protocol    POC URINE PREGNANCY    RH TYPE FOR RHOGAM FROM E.D.    SALINE LOCK    URINALYSIS,CULTURE IF INDICATED    URINE MICROSCOPIC (W/UA)    US-OB PELVIS TRANSVAGINAL        Discharge Instructions       Miscarriage  A miscarriage is the loss of an unborn baby (fetus) before the 20th week of pregnancy. The cause is often unknown.   HOME CARE  · You may need to stay in bed (bed rest), or you may be able to do light activity. Go about activity as told by your doctor.  · Have help at home.  · Write down how many pads you use each day. Write down how soaked they are.  · Do not use tampons. Do not wash out your vagina (douche) or have sex (intercourse) until your doctor approves.  · Only take medicine as told by your doctor.  · Do not take aspirin.  · Keep all doctor visits as told.  · If you or your partner have problems with grieving, talk to your doctor. You can also try counseling. Give yourself time to grieve before trying to get pregnant again.  GET HELP RIGHT AWAY IF:  · You have bad cramps or pain in your back or belly (abdomen).  · You have a fever.  · You pass large clumps of blood (clots) from your vagina that are walnut-sized or larger. Save the clumps for your doctor to see.  · You pass large amounts of tissue from your vagina. Save the tissue for your doctor to see.  · You have more bleeding.  · You have thick, bad-smelling fluid (discharge) coming from the vagina.  · You get lightheaded, weak, or you pass out  (faint).  · You have chills.  MAKE SURE YOU:  · Understand these instructions.  · Will watch your condition.  · Will get help right away if you are not doing well or get worse.     This information is not intended to replace advice given to you by your health care provider. Make sure you discuss any questions you have with your health care provider.     Document Released: 03/11/2013 Document Reviewed: 03/11/2013  [employer] Patient Education ©2016 [employer]            Patient Information     Patient Information    Following emergency treatment: all patient requiring follow-up care must return either to a private physician or a clinic if your condition worsens before you are able to obtain further medical attention, please return to the emergency room.     Billing Information    At [facility], we work to make the billing process streamlined for our patients.  Our Representatives are here to answer any questions you may have regarding your hospital bill.  If you have insurance coverage and have supplied your insurance information to us, we will submit a claim to your insurer on your behalf.  Should you have any questions regarding your bill, we can be reached online or by phone as follows:  Online: You are able pay your bills online or live chat with our representatives about any billing questions you may have. We are here to help Monday - Friday from 8:00am to 7:30pm and 9:00am - 12:00pm on Saturdays.  Please visit [email or web address]  for more information.   Phone:  [phone] or [phone]    Please note that your emergency physician, surgeon, pathologist, radiologist, anesthesiologist, and other specialists are not employed by [facility] and will therefore bill separately for their services.  Please contact them directly for any questions concerning their bills at the numbers below:     Emergency Physician Services:  [phone]  [facility]:   [phone]  [facility]:  [phone]  [facility]:  [phone]    1. Your final bill may vary from the amount quoted upon discharge if all procedures are not complete at that time, or if your doctor has additional procedures of which we are not aware. You will receive an additional bill if you return to the Emergency Department at [facility] for suture removal regardless of the facility of which the sutures were placed.     2. Please arrange for settlement of this account at the emergency registration.    3. All self-pay accounts are due in full at the time of treatment.  If you are unable to meet this obligation then payment is expected within 4-5 days.     4. If you have had radiology studies (CT, X-ray, Ultrasound, MRI), you have received a preliminary result during your emergency department visit. Please contact the radiology department [phone] to receive a copy of your final result. Please discuss the Final result with your primary physician or with the follow up physician provided.     Crisis Hotline:  [place] Crisis Hotline:  4-728-AAHABXJ or 1-385.913.7119  [place] Crisis Hotline:    1-867.837.7636 or 933-599-6270         ED Discharge Follow Up Questions    1. In order to provide you with very good care, we would like to follow up with a phone call in the next few days.  May we have your permission to contact you?     YES /  NO    2. What is the best phone number to call you? (       )_____-__________    3. What is the best time to call you?      Morning  /  Afternoon  /  Evening                   Patient Signature:  ____________________________________________________________    Date:  ____________________________________________________________      Your appointments     Sep 20, 2017  3:15 PM   GYN Visit with Amy Renae M.D.   [facility])    [address]  [place] [address]      [phone]

## 2017-08-09 NOTE — ED NOTES
"Med rec updated and complete  Allergies reviewed  Pt states \"No prescription medications\".  Pt states \"No antibiotics in the last 30 days\".     "

## 2017-08-09 NOTE — ED AVS SNAPSHOT
Microinox Access Code: Activation code not generated  Current Microinox Status: Active    CS Discohart  A secure, online tool to manage your health information     [employer]’s Microinox® is a secure, online tool that connects you to your personalized health information from the privacy of your home -- day or night - making it very easy for you to manage your healthcare. Once the activation process is completed, you can even access your medical information using the Microinox rhea, which is available for free in the Apple Rhea store or Google Play store.     Microinox provides the following levels of access (as shown below):   My Chart Features   [facility] Primary Care Doctor [facility]  Specialists [facility]  Urgent  Care Non-[facility]  Primary Care  Doctor   Email your healthcare team securely and privately 24/7 X X X X   Manage appointments: schedule your next appointment; view details of past/upcoming appointments X      Request prescription refills. X      View recent personal medical records, including lab and immunizations X X X X   View health record, including health history, allergies, medications X X X X   Read reports about your outpatient visits, procedures, consult and ER notes X X X X   See your discharge summary, which is a recap of your hospital and/or ER visit that includes your diagnosis, lab results, and care plan. X X       How to register for Microinox:  1. Go to  [email or web address].  2. Click on the Sign Up Now box, which takes you to the New Member Sign Up page. You will need to provide the following information:  a. Enter your Microinox Access Code exactly as it appears at the top of this page. (You will not need to use this code after you’ve completed the sign-up process. If you do not sign up before the expiration date, you must request a new code.)   b. Enter your date of birth.   c. Enter your home email address.   d. Click Submit, and follow the next screen’s instructions.  3. Create a Microinox ID. This will  be your [employer] login ID and cannot be changed, so think of one that is secure and easy to remember.  4. Create a [employer] password. You can change your password at any time.  5. Enter your Password Reset Question and Answer. This can be used at a later time if you forget your password.   6. Enter your e-mail address. This allows you to receive e-mail notifications when new information is available in [employer].  7. Click Sign Up. You can now view your health information.    For assistance activating your [employer] account, call [phone]

## 2017-08-09 NOTE — ED NOTES
Pt bib self with c/o of abd cramping and vaginal bleeding. Pt states she had a positive home pregnancy test 10 weeks ago but hasn't seen her OB/GYN for confirmation. Yesterday she developed abd cramping and bleeding. Today reports heavier vaginal bleeding.

## 2017-08-10 NOTE — ED PROVIDER NOTES
ED Provider Note    CHIEF COMPLAINT  Chief Complaint   Patient presents with   • Vaginal Bleeding   • Abdominal Cramping       HPI  Amy Correa is a 31 y.o.  with one previous miscarriage female who presents approximately 10 weeks pregnant by dates with a positive pregnancy test at home 2 months ago. She started having light vaginal bleeding yesterday and passed what she thought might have been tissue. Today, the bleeding got heavier and she started having some cramping through her pelvis into her back. She denies dysuria, recent fevers or vomiting. She was unable to get into Dr. Trish Huddleston who is her gynecologist and was instructed to come to the ER.    REVIEW OF SYSTEMS  See HPI for further details. All other systems are negative.    PAST MEDICAL HISTORY  Past Medical History   Diagnosis Date   • Known health problems: none    • Microcytic anemia 2017   • Subclinical hypothyroidism 2017       FAMILY HISTORY  Family History   Problem Relation Age of Onset   • Diabetes Maternal Uncle    • Diabetes Maternal Grandmother    • No Known Problems Mother    • No Known Problems Sister    • Heart Disease Maternal Grandfather    • No Known Problems Paternal Grandmother    • No Known Problems Paternal Grandfather        SOCIAL HISTORY  Social History     Social History   • Marital Status:      Spouse Name: N/A   • Number of Children: N/A   • Years of Education: N/A     Social History Main Topics   • Smoking status: Never Smoker    • Smokeless tobacco: Never Used   • Alcohol Use: 0.0 oz/week     0 Standard drinks or equivalent per week      Comment: occ   • Drug Use: No   • Sexual Activity:     Partners: Male     Birth Control/ Protection: Condom     Other Topics Concern   • None     Social History Narrative       SURGICAL HISTORY  Past Surgical History   Procedure Laterality Date   • Dilation and curettage       , missed carriage   • Hernia repair       9 yrs old, umbilical hernia  "      CURRENT MEDICATIONS  Home Medications     Reviewed by Horace Simmons (Pharmacy Tech) on 08/09/17 at 1612  Med List Status: Complete    Medication Last Dose Status    therapeutic multivitamin-minerals (THERAGRAN-M) Tab 8/9/2017 Active                ALLERGIES  Allergies   Allergen Reactions   • Penicillins Rash       PHYSICAL EXAM  VITAL SIGNS: /88 mmHg  Pulse 91  Temp(Src) 36 °C (96.8 °F)  Resp 20  Ht 1.549 m (5' 1\")  Wt 87 kg (191 lb 12.8 oz)  BMI 36.26 kg/m2  SpO2 97%  LMP 10/14/2013 @CARINA[[id number]::@   Constitutional: Well developed, Well nourished, No acute respiratory distress, Non-toxic appearance.   HENT: Normocephalic, Atraumatic, Bilateral external ears normal, Oropharynx clear, mucous membranes are moist.  Eyes: PERRLA, EOMI, Conjunctiva normal, No discharge. No icterus.  Neck: Normal range of motion. Supple, No stridor.   Lymphatic: No cervical lymphadenopathy noted.   Cardiovascular: Normal heart rate, Normal rhythm, No murmurs, No rubs, No gallops.   Thorax & Lungs: Clear to auscultation bilaterally, No respiratory distress, No wheezing.  Abdomen: Soft, flat, normal active bowel sounds, no tenderness to palpation, no masses  : Normal external female genitalia. Vaginal vault with moderate amount of dark red blood including clots but no products of conception noted. Cervical os is open to fingertip  Skin: Warm, Dry, No erythema, No rash.   Extremities: Intact distal pulses, No edema, No tenderness  Musculoskeletal: Good range of motion in all major joints. No tenderness to palpation or major deformities noted. Normal gait.  Neurologic: Alert & oriented x 3, cranial nerve and cerebellar exams grossly normal. No focal deficits noted.   Psychiatric: Affect normal, Judgment normal, Mood normal.       RADIOLOGY/PROCEDURES  US-OB PELVIS TRANSVAGINAL   Final Result      1.  No intrauterine gestational sac or live intrauterine pregnancy appreciated. There is evidence for clot in the " vagina on transabdominal images. Uterus is prominent in size.      2.  2 cm right ovarian simple cyst. Left ovary not identified.      3.  No adnexal masses or free fluid observed.            COURSE & MEDICAL DECISION MAKING  Pertinent Labs & Imaging studies reviewed. (See chart for details)  Differential diagnosis includes spontaneous , ectopic pregnancy, ovarian cyst    Results for orders placed or performed during the hospital encounter of 17   URINALYSIS,CULTURE IF INDICATED   Result Value Ref Range    Color Red     Character Hazy (A)     Specific Gravity 1.025 <1.035    Ph 7.0 5.0-8.0    Glucose Negative Negative mg/dL    Ketones Negative Negative mg/dL    Protein 100 (A) Negative mg/dL    Bilirubin Negative Negative    Nitrite Negative Negative    Leukocyte Esterase Negative Negative    Occult Blood Large (A) Negative    Micro Urine Req Microscopic     Culture Indicated No UA Culture   HCG QUANTITATIVE SERUM   Result Value Ref Range    Bhcg 1721.0 (H) 0.0 - 10.0 mIU/mL   RH TYPE FOR RHOGAM FROM E.D.   Result Value Ref Range    Emergency Department Rh Typing POS     Number Of Rh Doses Indicated ZERO    CBC WITHOUT DIFFERENTIAL   Result Value Ref Range    WBC 11.8 (H) 4.8 - 10.8 K/uL    RBC 4.79 4.20 - 5.40 M/uL    Hemoglobin 12.2 12.0 - 16.0 g/dL    Hematocrit 38.3 37.0 - 47.0 %    MCV 80.0 (L) 81.4 - 97.8 fL    MCH 25.5 (L) 27.0 - 33.0 pg    MCHC 31.9 (L) 33.6 - 35.0 g/dL    Platelet Count 281 164 - 446 K/uL    MPV 9.9 9.0 - 12.9 fL   URINE MICROSCOPIC (W/UA)   Result Value Ref Range    WBC 0-2 /hpf    RBC >150 (A) /hpf    Epithelial Cells Few Few /hpf    Mucous Threads Few /hpf   POC URINE PREGNANCY   Result Value Ref Range    POC Urine Pregnancy Test Positive (A)      6:11 PM patient states that her cramping has improved upon reevaluation. We're preparing for pelvic exam.    6:41 PM pelvic exam performed. Page out for gynecology.    6:48 PM answering service refuses to page gynecologist.  Patient's comfortable following up with her as an outpatient. I have informed her that if her symptoms get worse over the weekend, she needs to go to [facility] since we have gynecology on staff at all times. She is comfortable with the plan and is feeling much better.    I reviewed prescription monitoring program for patient's narcotic use before prescribing a scheduled drug.The patient will not drink alcohol nor drive with prescribed medications The patient will return for new or worsening symptoms and is stable at the time of discharge.    The patient is referred to a primary physician for blood pressure management, diabetic screening, and for all other preventative health concerns.    DISPOSITION:  Patient will be discharged home in stable condition.    FOLLOW UP:  Amy Renae M.D.  [address]  [facility] [address] [phone]    Call in 1 day  for recheck within the next few days    [facility] Emergency Dept  [address] [facility] [address] [phone]    As needed, If symptoms worsen      OUTPATIENT MEDICATIONS:  New Prescriptions    HYDROCODONE-ACETAMINOPHEN (NORCO) 5-325 MG TAB PER TABLET    Take 1-2 Tabs by mouth every four hours as needed.           FINAL IMPRESSION  1. Complete spontaneous  without mention of complication    2. Right ovarian cyst               Electronically signed by: Valentine Cody, 2017 5:02 PM

## 2017-08-10 NOTE — ED NOTES
Patient tearful  Discharged to home with instructions and prescription  Patient will follow up with her gynecologist

## 2017-08-10 NOTE — DISCHARGE INSTRUCTIONS
Miscarriage  A miscarriage is the loss of an unborn baby (fetus) before the 20th week of pregnancy. The cause is often unknown.   HOME CARE  · You may need to stay in bed (bed rest), or you may be able to do light activity. Go about activity as told by your doctor.  · Have help at home.  · Write down how many pads you use each day. Write down how soaked they are.  · Do not use tampons. Do not wash out your vagina (douche) or have sex (intercourse) until your doctor approves.  · Only take medicine as told by your doctor.  · Do not take aspirin.  · Keep all doctor visits as told.  · If you or your partner have problems with grieving, talk to your doctor. You can also try counseling. Give yourself time to grieve before trying to get pregnant again.  GET HELP RIGHT AWAY IF:  · You have bad cramps or pain in your back or belly (abdomen).  · You have a fever.  · You pass large clumps of blood (clots) from your vagina that are walnut-sized or larger. Save the clumps for your doctor to see.  · You pass large amounts of tissue from your vagina. Save the tissue for your doctor to see.  · You have more bleeding.  · You have thick, bad-smelling fluid (discharge) coming from the vagina.  · You get lightheaded, weak, or you pass out (faint).  · You have chills.  MAKE SURE YOU:  · Understand these instructions.  · Will watch your condition.  · Will get help right away if you are not doing well or get worse.     This information is not intended to replace advice given to you by your health care provider. Make sure you discuss any questions you have with your health care provider.     Document Released: 03/11/2013 Document Reviewed: 03/11/2013  [employer] Interactive Patient Education ©2016 [employer]

## 2017-08-22 ENCOUNTER — GYNECOLOGY VISIT (OUTPATIENT)
Dept: OBGYN | Facility: MEDICAL CENTER | Age: 32
End: 2017-08-22
Payer: COMMERCIAL

## 2017-08-22 VITALS
HEIGHT: 61 IN | SYSTOLIC BLOOD PRESSURE: 118 MMHG | BODY MASS INDEX: 35.87 KG/M2 | DIASTOLIC BLOOD PRESSURE: 70 MMHG | WEIGHT: 190 LBS

## 2017-08-22 DIAGNOSIS — O03.9 SAB (SPONTANEOUS ABORTION): ICD-10-CM

## 2017-08-22 DIAGNOSIS — Z30.09 FAMILY PLANNING EDUCATION, GUIDANCE, AND COUNSELING: ICD-10-CM

## 2017-08-22 PROCEDURE — 99214 OFFICE O/P EST MOD 30 MIN: CPT | Performed by: OBSTETRICS & GYNECOLOGY

## 2017-08-22 RX ORDER — MEDROXYPROGESTERONE ACETATE 150 MG/ML
150 INJECTION, SUSPENSION INTRAMUSCULAR ONCE
Qty: 1 VIAL | Refills: 4 | Status: SHIPPED | OUTPATIENT
Start: 2017-08-22 | End: 2017-08-22

## 2017-08-22 NOTE — MR AVS SNAPSHOT
"        Amy Krishnamurthy Sunny   2017 2:45 PM   Gynecology Visit   MRN: [id number]    Department:  [facility]   Dept Phone:  [phone]    Description:  Female : 1985   Provider:  Amy Renae M.D.           Reason for Visit     Other follow up on SAB      Allergies as of 2017     Allergen Noted Reactions    Penicillins 2012   Rash      You were diagnosed with     SAB (spontaneous )   [[id number]]         Vital Signs     Blood Pressure Height Weight Body Mass Index Last Menstrual Period Smoking Status    118/70 mmHg 1.549 m (5' 0.98\") 86.183 kg (190 lb) 35.92 kg/m2 10/14/2013 Never Smoker       Basic Information     Date Of Birth Sex Race Ethnicity Preferred Language    1985 Female  or   Origin (Vietnamese,Uruguayan,Montserratian,Martiniquais, etc) English      Problem List              ICD-10-CM Priority Class Noted - Resolved    Obesity (BMI 30-39.9) E66.9   2017 - Present    Microcytic anemia D50.9   2017 - Present    Subclinical hypothyroidism E03.9   2017 - Present      Health Maintenance        Date Due Completion Dates    IMM INFLUENZA (1) 2017 ---    PAP SMEAR 2020, 2014    IMM DTaP/Tdap/Td Vaccine (2 - Td) 6/3/2024 6/3/2014            Current Immunizations     Tdap Vaccine 6/3/2014      Below and/or attached are the medications your provider expects you to take. Review all of your home medications and newly ordered medications with your provider and/or pharmacist. Follow medication instructions as directed by your provider and/or pharmacist. Please keep your medication list with you and share with your provider. Update the information when medications are discontinued, doses are changed, or new medications (including over-the-counter products) are added; and carry medication information at all times in the event of emergency situations     Allergies:  PENICILLINS - Rash               Medications  Valid as " of: August 22, 2017 -  3:14 PM    Generic Name Brand Name Tablet Size Instructions for use    Hydrocodone-Acetaminophen (Tab) NORCO 5-325 MG Take 1-2 Tabs by mouth every four hours as needed.        Multiple Vitamins-Minerals (Tab) THERAGRAN-M  Take 1 Tab by mouth every day.        .                 Medicines prescribed today were sent to:     [facility][id number] - [place], [place] - [address]    [address] [place] [address]    Phone: [phone] Fax: [phone]    Open 24 Hours?: No      Medication refill instructions:       If your prescription bottle indicates you have medication refills left, it is not necessary to call your provider’s office. Please contact your pharmacy and they will refill your medication.    If your prescription bottle indicates you do not have any refills left, you may request refills at any time through one of the following ways: The online Provender system (except Urgent Care), by calling your provider’s office, or by asking your pharmacy to contact your provider’s office with a refill request. Medication refills are processed only during regular business hours and may not be available until the next business day. Your provider may request additional information or to have a follow-up visit with you prior to refilling your medication.   *Please Note: Medication refills are assigned a new Rx number when refilled electronically. Your pharmacy may indicate that no refills were authorized even though a new prescription for the same medication is available at the pharmacy. Please request the medicine by name with the pharmacy before contacting your provider for a refill.        Your To Do List     Future Labs/Procedures Complete By [employer]    HCG QUANTITATIVE  As directed 8/22/2018         Provender Access Code: Activation code not generated  Current Provender Status: Active

## 2017-08-22 NOTE — PROGRESS NOTES
"Chief Complaint   Patient presents with   • Other     follow up on SAB       History of present illness:   31 y.o.  presents as ER ff-up on probable SAB  No vaginal bleeding, no abdominal pains    Review of systems:  Pertinent positives documented in HPI and all other systems reviewed & are negative    All PMH, PSH, allergies, social history and FH reviewed and updated today:  Past Medical History   Diagnosis Date   • Known health problems: none    • Microcytic anemia 2017   • Subclinical hypothyroidism 2017       Past Surgical History   Procedure Laterality Date   • Dilation and curettage       , missed carriage   • Hernia repair       9 yrs old, umbilical hernia       Allergies:   Allergies   Allergen Reactions   • Penicillins Rash       Social History     Social History   • Marital Status:      Spouse Name: N/A   • Number of Children: N/A   • Years of Education: N/A     Occupational History   • Not on file.     Social History Main Topics   • Smoking status: Never Smoker    • Smokeless tobacco: Never Used   • Alcohol Use: 0.0 oz/week     0 Standard drinks or equivalent per week      Comment: occ   • Drug Use: No   • Sexual Activity:     Partners: Male     Birth Control/ Protection: Condom     Other Topics Concern   • Not on file     Social History Narrative       Family History   Problem Relation Age of Onset   • Diabetes Maternal Uncle    • Diabetes Maternal Grandmother    • No Known Problems Mother    • No Known Problems Sister    • Heart Disease Maternal Grandfather    • No Known Problems Paternal Grandmother    • No Known Problems Paternal Grandfather        Physical exam:  Blood pressure 118/70, height 1.549 m (5' 0.98\"), weight 86.183 kg (190 lb), last menstrual period 10/14/2013.    General:appears stated age, is in no apparent distress, is well developed and well nourished  Head: normocephalic, non-tender  Abdomen: Bowel sounds positive, nondistended, soft, nontender x4, no " rebound or guarding. No organomegaly. No masses.  Female GYN: cervix - closed NT  Uterus and adnexa - no masses no tenderness  Skin: No rashes, or ulcers or lesions seen  Psychiatric: Patient shows appropriate affect, is alert and oriented x3, intact judgment and insight.  1. SAB (spontaneous )  HCG QUANTITATIVE   2. Family planning education, guidance, and counseling  medroxyPROGESTERone (DEPO-PROVERA) 150 MG/ML Suspension   3. Pt discussed about options for BC. She prefers the deposhot. RIB and side effects on uterine bleeding  4. All questions addressed  5. Gyn PRN/annual  6. TVS - ES empty. No adnexal masses, normal ovaries

## 2017-08-24 ENCOUNTER — HOSPITAL ENCOUNTER (OUTPATIENT)
Dept: LAB | Facility: MEDICAL CENTER | Age: 32
End: 2017-08-24
Attending: OBSTETRICS & GYNECOLOGY
Payer: COMMERCIAL

## 2017-08-24 DIAGNOSIS — O03.9 SAB (SPONTANEOUS ABORTION): ICD-10-CM

## 2017-08-24 LAB — B-HCG SERPL-ACNC: 2.8 MIU/ML (ref 0–5)

## 2017-08-24 PROCEDURE — 36415 COLL VENOUS BLD VENIPUNCTURE: CPT

## 2017-08-24 PROCEDURE — 84702 CHORIONIC GONADOTROPIN TEST: CPT

## 2017-10-20 ENCOUNTER — OFFICE VISIT (OUTPATIENT)
Dept: URGENT CARE | Facility: CLINIC | Age: 32
End: 2017-10-20
Payer: COMMERCIAL

## 2017-10-20 ENCOUNTER — TELEPHONE (OUTPATIENT)
Dept: MEDICAL GROUP | Age: 32
End: 2017-10-20

## 2017-10-20 VITALS
HEIGHT: 60 IN | TEMPERATURE: 98.6 F | RESPIRATION RATE: 16 BRPM | WEIGHT: 189 LBS | SYSTOLIC BLOOD PRESSURE: 110 MMHG | DIASTOLIC BLOOD PRESSURE: 70 MMHG | BODY MASS INDEX: 37.11 KG/M2 | OXYGEN SATURATION: 98 % | HEART RATE: 72 BPM

## 2017-10-20 DIAGNOSIS — J02.0 STREP PHARYNGITIS: ICD-10-CM

## 2017-10-20 DIAGNOSIS — J02.9 SORE THROAT: ICD-10-CM

## 2017-10-20 LAB
INT CON NEG: NEGATIVE
INT CON POS: POSITIVE
S PYO AG THROAT QL: POSITIVE

## 2017-10-20 PROCEDURE — 99214 OFFICE O/P EST MOD 30 MIN: CPT | Performed by: PHYSICIAN ASSISTANT

## 2017-10-20 PROCEDURE — 87880 STREP A ASSAY W/OPTIC: CPT | Performed by: PHYSICIAN ASSISTANT

## 2017-10-20 RX ORDER — AZITHROMYCIN 250 MG/1
TABLET, FILM COATED ORAL
Qty: 6 TAB | Refills: 0 | Status: SHIPPED | OUTPATIENT
Start: 2017-10-20 | End: 2018-01-25

## 2017-10-20 ASSESSMENT — ENCOUNTER SYMPTOMS
MYALGIAS: 1
ABDOMINAL PAIN: 0
EYE REDNESS: 0
VOMITING: 0
CHILLS: 1
WHEEZING: 0
FEVER: 0
EYE DISCHARGE: 0
SHORTNESS OF BREATH: 0
TINGLING: 0
TROUBLE SWALLOWING: 0
HEADACHES: 0
NECK PAIN: 0
SORE THROAT: 1
DIARRHEA: 0
COUGH: 0
DIZZINESS: 0

## 2017-10-20 ASSESSMENT — PAIN SCALES - GENERAL: PAINLEVEL: NO PAIN

## 2017-10-20 NOTE — TELEPHONE ENCOUNTER
ESTABLISHED PATIENT PRE-VISIT PLANNING     Note: Patient will not be contacted if there is no indication to call.     1.  Reviewed notes from the last few office visits within the medical group: Yes    2.  If any orders were placed at last visit or intended to be done for this visit (i.e. 6 mos follow-up), do we have Results/Consult Notes?        •  Labs - Labs were not ordered at last office visit.   Note: If patient appointment is for lab review and patient did not complete labs,                check with provider if OK to reschedule patient until labs completed.       •  Imaging - Imaging was not ordered at last office visit.       •  Referrals - No referrals were ordered at last office visit.    3. Is this appointment scheduled as a Hospital Follow-Up? No    4.  Immunizations were updated in Epic using WebIZ?: Epic matches WebIZ       •  Web Iz Recommendations: FLU, MMR , TD and cpox    5.  Patient is due for the following Health Maintenance Topics:   Health Maintenance Due   Topic Date Due   • IMM INFLUENZA (1) 09/01/2017       - Patient is up-to-date on all Health Maintenance topics. No records have been requested at this time.    6.  Patient was NOT informed to arrive 15 min prior to their scheduled appointment and bring in their medication bottles.

## 2017-10-21 NOTE — PROGRESS NOTES
Subjective:      Amy Correa is a 32 y.o. female who presents with Pharyngitis (and a break out on lip)            Pt. 31 y/o female who presents with sore throat, fatigue that started this morning- with recent exposure to strep.        Pharyngitis    This is a new problem. The pain is at a severity of 4/10. The pain is moderate. Pertinent negatives include no abdominal pain, congestion, coughing, diarrhea, ear discharge, headaches, neck pain, shortness of breath, trouble swallowing or vomiting. She has had exposure to strep. She has had no exposure to mono.       Review of Systems   Constitutional: Positive for chills and malaise/fatigue. Negative for fever.   HENT: Positive for sore throat. Negative for congestion, ear discharge and trouble swallowing.    Eyes: Negative for discharge and redness.   Respiratory: Negative for cough, shortness of breath and wheezing.    Cardiovascular: Negative for chest pain and leg swelling.   Gastrointestinal: Negative for abdominal pain, diarrhea and vomiting.   Genitourinary: Negative for dysuria and urgency.   Musculoskeletal: Positive for myalgias. Negative for neck pain.   Skin: Negative for itching and rash.   Neurological: Negative for dizziness, tingling and headaches.          Objective:     /70   Pulse 72   Temp 37 °C (98.6 °F)   Resp 16   Ht 1.524 m (5')   Wt 85.7 kg (189 lb)   LMP 10/14/2013   SpO2 98%   Breastfeeding? No   BMI 36.91 kg/m²    PMH:  has a past medical history of Known health problems: none; Microcytic anemia (6/29/2017); and Subclinical hypothyroidism (6/29/2017). She also has no past medical history of Addisons disease (CMS-HCC); Adrenal disorder (CMS-HCC); Allergy; Anxiety; Arrhythmia; Arthritis; ASTHMA; Blood transfusion; Cancer (CMS-HCC); CATARACT; CHF (congestive heart failure) (CMS-HCC); Clotting disorder (CMS-HCC); COPD; Cushings syndrome (CMS-HCC); Depression; Diabetes; Diabetic neuropathy (CMS-HCC); EMPHYSEMA; GERD  (gastroesophageal reflux disease); Glaucoma; Goiter; Muscle disorder; OSTEOPOROSIS; Pituitary disease (CMS-[facility]); Seizure (CMS-[facility]); Sickle cell disease (CMS-[facility]); Stroke (CMS-[facility]); Substance abuse; Tuberculosis; Ulcer (CMS-HCC); or Urinary tract infection, site not specified.  MEDS:   Current Outpatient Prescriptions:   •  azithromycin (ZITHROMAX) 250 MG Tab, Take 2 tabs today then 1 tab daily til completed., Disp: 6 Tab, Rfl: 0  •  therapeutic multivitamin-minerals (THERAGRAN-M) Tab, Take 1 Tab by mouth every day., Disp: , Rfl:   •  hydrocodone-acetaminophen (NORCO) 5-325 MG Tab per tablet, Take 1-2 Tabs by mouth every four hours as needed., Disp: 20 Tab, Rfl: 0  ALLERGIES:   Allergies   Allergen Reactions   • Penicillins Rash     SURGHX:   Past Surgical History:   Procedure Laterality Date   • DILATION AND CURETTAGE      2011, missed carriage   • HERNIA REPAIR      9 yrs old, umbilical hernia     SOCHX:  reports that she has never smoked. She has never used smokeless tobacco. She reports that she drinks alcohol. She reports that she does not use drugs.  FH: Family history was reviewed, no pertinent findings to report    Physical Exam   Constitutional: She is oriented to person, place, and time. She appears well-developed and well-nourished.   HENT:   Head: Normocephalic and atraumatic.   Right Ear: External ear normal.   Left Ear: External ear normal.   Nose: Nose normal.   Mouth/Throat: No oropharyngeal exudate.   Posterior oropharynx with diffuse erythema without significant edema.  Without evidence of abscess formation.    Eyes: EOM are normal. Pupils are equal, round, and reactive to light.   Neck: Normal range of motion. Neck supple. No thyromegaly present.   Cardiovascular: Normal rate and regular rhythm.    Pulmonary/Chest: Effort normal and breath sounds normal. No respiratory distress.   Musculoskeletal: Normal range of motion. She exhibits no edema.   Lymphadenopathy:     She has cervical adenopathy.    Neurological: She is alert and oriented to person, place, and time.   Skin: Skin is warm. No rash noted. No pallor.   Psychiatric: She has a normal mood and affect. Her behavior is normal.   Vitals reviewed.            POC- strep positive.     Assessment/Plan:     1. Strep pharyngitis  - azithromycin (ZITHROMAX) 250 MG Tab; Take 2 tabs today then 1 tab daily til completed.  Dispense: 6 Tab; Refill: 0    2. Sore throat  - POCT Rapid Strep A  - azithromycin (ZITHROMAX) 250 MG Tab; Take 2 tabs today then 1 tab daily til completed.  Dispense: 6 Tab; Refill: 0    Discussed the contagious nature of symptoms today, avoid night time dairy, increase fluids. Antipyretic meds encouraged.   Patient given precautionary s/sx that mandate immediate follow up and evaluation in the ED. Advised of risks of not doing so.    DDX, Supportive care, and indications for immediate follow-up discussed with patient.    Instructed to return to clinic or nearest emergency department if we are not available for any change in condition, further concerns, or worsening of symptoms.    The patient demonstrated a good understanding and agreed with the treatment plan.

## 2018-01-25 ENCOUNTER — OFFICE VISIT (OUTPATIENT)
Dept: URGENT CARE | Facility: CLINIC | Age: 33
End: 2018-01-25
Payer: COMMERCIAL

## 2018-01-25 VITALS
DIASTOLIC BLOOD PRESSURE: 78 MMHG | TEMPERATURE: 97.6 F | SYSTOLIC BLOOD PRESSURE: 120 MMHG | OXYGEN SATURATION: 98 % | BODY MASS INDEX: 39.54 KG/M2 | RESPIRATION RATE: 16 BRPM | WEIGHT: 201.4 LBS | HEIGHT: 60 IN | HEART RATE: 84 BPM

## 2018-01-25 DIAGNOSIS — J06.9 ACUTE URI: ICD-10-CM

## 2018-01-25 DIAGNOSIS — E66.9 OBESITY (BMI 35.0-39.9 WITHOUT COMORBIDITY): ICD-10-CM

## 2018-01-25 PROCEDURE — 99214 OFFICE O/P EST MOD 30 MIN: CPT | Performed by: PHYSICIAN ASSISTANT

## 2018-01-25 RX ORDER — AZITHROMYCIN 250 MG/1
TABLET, FILM COATED ORAL
Qty: 6 TAB | Refills: 0 | Status: SHIPPED | OUTPATIENT
Start: 2018-01-25 | End: 2018-02-05

## 2018-01-25 ASSESSMENT — ENCOUNTER SYMPTOMS
SINUS PAIN: 0
HEADACHES: 0
MUSCULOSKELETAL NEGATIVE: 1
GASTROINTESTINAL NEGATIVE: 1
DIZZINESS: 0
WHEEZING: 0
SORE THROAT: 1
SHORTNESS OF BREATH: 0
CHILLS: 0
TROUBLE SWALLOWING: 1
FEVER: 0
COUGH: 1
SWOLLEN GLANDS: 1
CARDIOVASCULAR NEGATIVE: 1

## 2018-01-25 NOTE — PROGRESS NOTES
Subjective:      Amy oCrrea is a 32 y.o. female who presents with Cough (felt like swallowed pill wrong and the next day began to feel the runny nose, phlegm, and feel like something is stuck in throat, blister on tongue )            Pharyngitis    This is a new problem. The current episode started yesterday. The problem has been gradually worsening. There has been no fever. The fever has been present for less than 1 day. Associated symptoms include congestion, coughing, swollen glands and trouble swallowing. Pertinent negatives include no ear pain, headaches or shortness of breath. She has had no exposure to strep. Treatments tried: Mucinex. The treatment provided mild relief.       PMH:  has a past medical history of Known health problems: none; Microcytic anemia (6/29/2017); and Subclinical hypothyroidism (6/29/2017). She also has no past medical history of Addisons disease (CMS-HCC); Adrenal disorder (CMS-HCC); Allergy; Anxiety; Arrhythmia; Arthritis; ASTHMA; Blood transfusion; Cancer (CMS-HCC); CATARACT; CHF (congestive heart failure) (CMS-HCC); Clotting disorder (CMS-HCC); COPD; Cushings syndrome (CMS-HCC); Depression; Diabetes; Diabetic neuropathy (CMS-HCC); EMPHYSEMA; GERD (gastroesophageal reflux disease); Glaucoma; Goiter; Muscle disorder; OSTEOPOROSIS; Pituitary disease (CMS-HCC); Seizure (CMS-HCC); Sickle cell disease (CMS-HCC); Stroke (CMS-HCC); Substance abuse; Tuberculosis; Ulcer (CMS-HCC); or Urinary tract infection, site not specified.  MEDS:   Current Outpatient Prescriptions:   •  Probiotic Product (PROBIOTIC PO), Take  by mouth., Disp: , Rfl:   •  therapeutic multivitamin-minerals (THERAGRAN-M) Tab, Take 1 Tab by mouth every day., Disp: , Rfl:   •  azithromycin (ZITHROMAX) 250 MG Tab, Take 2 tabs today then 1 tab daily til completed., Disp: 6 Tab, Rfl: 0  •  hydrocodone-acetaminophen (NORCO) 5-325 MG Tab per tablet, Take 1-2 Tabs by mouth every four hours as needed., Disp: 20 Tab, Rfl:  0  ALLERGIES:   Allergies   Allergen Reactions   • Penicillins Rash     SURGHX:   Past Surgical History:   Procedure Laterality Date   • DILATION AND CURETTAGE      2011, missed carriage   • HERNIA REPAIR      9 yrs old, umbilical hernia     SOCHX:  reports that she has never smoked. She has never used smokeless tobacco. She reports that she drinks alcohol. She reports that she does not use drugs.  FH: family history includes Diabetes in her maternal grandmother and maternal uncle; Heart Disease in her maternal grandfather; No Known Problems in her mother, paternal grandfather, paternal grandmother, and sister.      Review of Systems   Constitutional: Negative for chills and fever.   HENT: Positive for congestion, sore throat and trouble swallowing. Negative for ear pain and sinus pain.    Respiratory: Positive for cough. Negative for shortness of breath and wheezing.    Cardiovascular: Negative.    Gastrointestinal: Negative.    Musculoskeletal: Negative.    Neurological: Negative for dizziness and headaches.       Medications, Allergies, and current problem list reviewed today in Epic     Objective:     /78   Pulse 84   Temp 36.4 °C (97.6 °F)   Resp 16   Ht 1.524 m (5')   Wt 91.4 kg (201 lb 6.4 oz)   LMP 10/14/2013   SpO2 98%   Breastfeeding? No   BMI 39.33 kg/m²      Physical Exam   Constitutional: She is oriented to person, place, and time. She appears well-developed and well-nourished. No distress.   HENT:   Head: Normocephalic and atraumatic.   Right Ear: Tympanic membrane and external ear normal.   Left Ear: Tympanic membrane and external ear normal.   Nose: Nose normal.   Mouth/Throat: Oropharynx is clear and moist. No oropharyngeal exudate.   Eyes: Conjunctivae are normal. Right eye exhibits no discharge. Left eye exhibits no discharge.   Neck: Normal range of motion. Neck supple.   Cardiovascular: Normal rate, regular rhythm and normal heart sounds.    Pulmonary/Chest: Effort normal and  breath sounds normal. No respiratory distress. She has no wheezes.   Musculoskeletal: Normal range of motion.   Lymphadenopathy:     She has no cervical adenopathy.   Neurological: She is alert and oriented to person, place, and time.   Skin: Skin is warm and dry. She is not diaphoretic.   Psychiatric: She has a normal mood and affect. Her behavior is normal. Judgment and thought content normal.   Nursing note and vitals reviewed.              Assessment/Plan:     1. Acute URI  azithromycin (ZITHROMAX) 250 MG Tab   2. Obesity (BMI 35.0-39.9 without comorbidity)  Patient identified as having weight management issue.  Appropriate orders and counseling given.     Patient was given a contingent antibiotic prescription to fill and use as directed if symptoms progressed as discussed and agreed upon.  OTC meds and conservative measures as discussed  Return to clinic or go to ED if symptoms worsen or persist. Indications for ED discussed at length. Patient voices understanding. Follow-up with your primary care provider in 3-5 days. Red flags discussed.    Please note that this dictation was created using voice recognition software. I have made every reasonable attempt to correct obvious errors, but I expect that there are errors of grammar and possibly content that I did not discover before finalizing the note.

## 2018-02-05 ENCOUNTER — OFFICE VISIT (OUTPATIENT)
Dept: MEDICAL GROUP | Age: 33
End: 2018-02-05
Payer: COMMERCIAL

## 2018-02-05 VITALS
RESPIRATION RATE: 12 BRPM | HEIGHT: 62 IN | SYSTOLIC BLOOD PRESSURE: 118 MMHG | WEIGHT: 207 LBS | BODY MASS INDEX: 38.09 KG/M2 | TEMPERATURE: 98.2 F | HEART RATE: 88 BPM | OXYGEN SATURATION: 98 % | DIASTOLIC BLOOD PRESSURE: 88 MMHG

## 2018-02-05 DIAGNOSIS — N30.00 ACUTE CYSTITIS WITHOUT HEMATURIA: ICD-10-CM

## 2018-02-05 DIAGNOSIS — M54.50 ACUTE RIGHT-SIDED LOW BACK PAIN WITHOUT SCIATICA: ICD-10-CM

## 2018-02-05 LAB
APPEARANCE UR: ABNORMAL
BILIRUB UR STRIP-MCNC: NEGATIVE MG/DL
COLOR UR AUTO: ABNORMAL
GLUCOSE UR STRIP.AUTO-MCNC: NEGATIVE MG/DL
KETONES UR STRIP.AUTO-MCNC: NEGATIVE MG/DL
LEUKOCYTE ESTERASE UR QL STRIP.AUTO: ABNORMAL
NITRITE UR QL STRIP.AUTO: NEGATIVE
PH UR STRIP.AUTO: 6.5 [PH] (ref 5–8)
PROT UR QL STRIP: ABNORMAL MG/DL
RBC UR QL AUTO: ABNORMAL
SP GR UR STRIP.AUTO: 1.02
UROBILINOGEN UR STRIP-MCNC: 0.2 MG/DL

## 2018-02-05 PROCEDURE — 81002 URINALYSIS NONAUTO W/O SCOPE: CPT | Performed by: INTERNAL MEDICINE

## 2018-02-05 PROCEDURE — 99214 OFFICE O/P EST MOD 30 MIN: CPT | Performed by: INTERNAL MEDICINE

## 2018-02-05 RX ORDER — NITROFURANTOIN 25; 75 MG/1; MG/1
100 CAPSULE ORAL 2 TIMES DAILY
Qty: 10 CAP | Refills: 0 | Status: SHIPPED | OUTPATIENT
Start: 2018-02-05 | End: 2018-02-06

## 2018-02-05 NOTE — ASSESSMENT & PLAN NOTE
This is a new problem. Patient reported that she started having a pain on the right lower back early this morning at 4:00 AM. She also noticed increased frequency of urination and cloudy urine. Her symptoms gradually worsen. She denied fevers, chills, abdominal pain or nausea, vomiting, diarrhea. She started drinking plenty of water this morning and Advil.

## 2018-02-05 NOTE — PROGRESS NOTES
Subjective:   Amy Correa is a 32 y.o. female here today for evaluation and management of:      Acute cystitis without hematuria  This is a new problem. Patient reported that she started having a pain on the right lower back early this morning at 4:00 AM. She also noticed increased frequency of urination and cloudy urine. Her symptoms gradually worsen. She denied fevers, chills, abdominal pain or nausea, vomiting, diarrhea. She started drinking plenty of water this morning and Advil.     Acute right-sided low back pain without sciatica  This is a new problem. Symptoms started this morning at 4 AM. Patient reported having sharp pain on right lower back. She tried Advil 400 mg this morning. Her pain decreased with Advil. She still has throbbing sore on right lower back but pain did not radiated to lower extremity. She denied fever, chills or abdominal pain. She still has her menstrual period.         Current medicines (including changes today)  Current Outpatient Prescriptions   Medication Sig Dispense Refill   • nitrofurantoin monohydr macro (MACROBID) 100 MG Cap Take 1 Cap by mouth 2 times a day for 5 days. 10 Cap 0   • Probiotic Product (PROBIOTIC PO) Take  by mouth.     • therapeutic multivitamin-minerals (THERAGRAN-M) Tab Take 1 Tab by mouth every day.       No current facility-administered medications for this visit.      She  has a past medical history of Known health problems: none; Microcytic anemia (6/29/2017); and Subclinical hypothyroidism (6/29/2017). She also has no past medical history of Addisons disease (CMS-HCC); Adrenal disorder (CMS-HCC); Allergy; Anxiety; Arrhythmia; Arthritis; ASTHMA; Blood transfusion; Cancer (CMS-HCC); CATARACT; CHF (congestive heart failure) (CMS-HCC); Clotting disorder (CMS-HCC); COPD; Cushings syndrome (CMS-HCC); Depression; Diabetes; Diabetic neuropathy (CMS-HCC); EMPHYSEMA; GERD (gastroesophageal reflux disease); Glaucoma; Goiter; Muscle disorder; OSTEOPOROSIS;  "Pituitary disease (CMS-[facility]); Seizure (CMS-[facility]); Sickle cell disease (CMS-[facility]); Stroke (CMS-[facility]); Substance abuse; Tuberculosis; Ulcer (CMS-[facility]); or Urinary tract infection, site not specified.    ROS   No chest pain, no shortness of breath, no abdominal pain       Objective:     Blood pressure 118/88, pulse 88, temperature 36.8 °C (98.2 °F), resp. rate 12, height 1.575 m (5' 2\"), weight 93.9 kg (207 lb), last menstrual period 01/29/2018, SpO2 98 %, not currently breastfeeding. Body mass index is 37.86 kg/m².   Physical Exam:  General: Alert, oriented and no acute distress.  Eye contact is good, speech goal directed, affect calm  HEENT: conjunctiva non-injected, sclera non-icteric.  Oral mucous membranes pink and moist with no lesions.  Pinna normal.  Lungs: Normal respiratory effort, clear to auscultation bilaterally with good excursion.  CV: regular rate and rhythm. No murmurs.  Abdomen: soft, non distended, nontender, No CVAT, Bowel sound normal.  Ext: no edema, color normal, vascularity normal, temperature normal  Musculoskeletal exam: Mild tender on right lower back at L3-L5.     POCT UA in clinic today. Reviewed UA report with patient.  Results for AMBER HARRIS (MRN [id number]) as of 2/5/2018 14:43   Ref. Range 2/5/2018 13:40   POC Color Latest Ref Range: Negative  Gold   POC Appearance Latest Ref Range: Negative  Cloudy   POC Specific Gravity Latest Ref Range: <1.005 - >1.030  1.025   POC Urine PH Latest Ref Range: 5.0 - 8.0  6.5   POC Glucose Latest Ref Range: Negative mg/dL Negative   POC Ketones Latest Ref Range: Negative mg/dL Negative   POC Protein Latest Ref Range: Negative mg/dL Trace   POC Nitrites Latest Ref Range: Negative  Negative   POC Leukocyte Esterase Latest Ref Range: Negative  Small   POC Blood Latest Ref Range: Negative  Hemolyzed   POC Biliruben Latest Ref Range: Negative mg/dL Negative   POC Urobiligen Latest Ref Range: Negative (0.2) mg/dL 0.2       Assessment and Plan:   The " following treatment plan was discussed     1. Acute cystitis without hematuria  - Patient has positive UA with small leukocyte esterase, trace protein, hemolyzed blood and cloudy urine. She has increased frequency of urination.  - We will try Macrobid 100 mg twice a day for 5 days. . Reviewed potential side effect of Macrobid with patient.  - Patient is advised to take probiotic daily.  - Patient is advised to increase water intake, fluid intake and take cranberry daily.  - Patient is advised to seek urgent medical attention if she has worsening symptoms or fever or chills or nausea, vomiting or abdominal pain or flank pain. She understands and agrees with plan.  - nitrofurantoin monohydr macro (MACROBID) 100 MG Cap; Take 1 Cap by mouth 2 times a day for 5 days.  Dispense: 10 Cap; Refill: 0  - POCT Urinalysis    2. Acute right-sided low back pain without sciatica  - It is likely musculoskeletal in pain. She does not have CVA tenderness on exam.  - Her back pain improved with Advil. Advised to take Advil 400 mg twice a day when necessary with meals. She can also take Tylenol if she feels stomach discomfort from taking Advil.  - We also discussed stretching exercises on her back, avoid lifting heavy stuff, apply heating pad as needed.  - Patient is advised to return to clinic if her symptoms do not improve. Patient understands and agrees with plan.        Followup: Return if symptoms worsen or fail to improve.      Please note that this dictation was created using voice recognition software. I have made every reasonable attempt to correct obvious errors, but I expect that there may have unintended errors in text, spelling, punctuation, or grammar that I did not discover.

## 2018-02-05 NOTE — ASSESSMENT & PLAN NOTE
This is a new problem. Symptoms started this morning at 4 AM. Patient reported having sharp pain on right lower back. She tried Advil 400 mg this morning. Her pain decreased with Advil. She still has throbbing sore on right lower back but pain did not radiated to lower extremity. She denied fever, chills or abdominal pain. She still has her menstrual period.

## 2018-02-06 ENCOUNTER — HOSPITAL ENCOUNTER (EMERGENCY)
Facility: MEDICAL CENTER | Age: 33
End: 2018-02-06
Attending: EMERGENCY MEDICINE
Payer: COMMERCIAL

## 2018-02-06 ENCOUNTER — APPOINTMENT (OUTPATIENT)
Dept: RADIOLOGY | Facility: MEDICAL CENTER | Age: 33
End: 2018-02-06
Attending: EMERGENCY MEDICINE
Payer: COMMERCIAL

## 2018-02-06 VITALS
RESPIRATION RATE: 18 BRPM | DIASTOLIC BLOOD PRESSURE: 58 MMHG | WEIGHT: 201 LBS | HEART RATE: 71 BPM | TEMPERATURE: 98.5 F | SYSTOLIC BLOOD PRESSURE: 108 MMHG | OXYGEN SATURATION: 95 % | BODY MASS INDEX: 36.76 KG/M2

## 2018-02-06 DIAGNOSIS — N20.0 KIDNEY STONE: ICD-10-CM

## 2018-02-06 LAB
ALBUMIN SERPL BCP-MCNC: 4.4 G/DL (ref 3.2–4.9)
ALBUMIN/GLOB SERPL: 1.2 G/DL
ALP SERPL-CCNC: 61 U/L (ref 30–99)
ALT SERPL-CCNC: 18 U/L (ref 2–50)
ANION GAP SERPL CALC-SCNC: 14 MMOL/L (ref 0–11.9)
APPEARANCE UR: CLEAR
AST SERPL-CCNC: 43 U/L (ref 12–45)
BACTERIA #/AREA URNS HPF: ABNORMAL /HPF
BASOPHILS # BLD AUTO: 0.3 % (ref 0–1.8)
BASOPHILS # BLD: 0.04 K/UL (ref 0–0.12)
BILIRUB SERPL-MCNC: 0.6 MG/DL (ref 0.1–1.5)
BILIRUB UR QL STRIP.AUTO: NEGATIVE
BUN SERPL-MCNC: 13 MG/DL (ref 8–22)
CALCIUM SERPL-MCNC: 9 MG/DL (ref 8.4–10.2)
CHLORIDE SERPL-SCNC: 104 MMOL/L (ref 96–112)
CO2 SERPL-SCNC: 19 MMOL/L (ref 20–33)
COLOR UR: YELLOW
CREAT SERPL-MCNC: 0.81 MG/DL (ref 0.5–1.4)
EOSINOPHIL # BLD AUTO: 0.03 K/UL (ref 0–0.51)
EOSINOPHIL NFR BLD: 0.2 % (ref 0–6.9)
EPI CELLS #/AREA URNS HPF: ABNORMAL /HPF
ERYTHROCYTE [DISTWIDTH] IN BLOOD BY AUTOMATED COUNT: 40.4 FL (ref 35.9–50)
GLOBULIN SER CALC-MCNC: 3.8 G/DL (ref 1.9–3.5)
GLUCOSE SERPL-MCNC: 112 MG/DL (ref 65–99)
GLUCOSE UR STRIP.AUTO-MCNC: NEGATIVE MG/DL
HCG SERPL QL: NEGATIVE
HCT VFR BLD AUTO: 40.3 % (ref 37–47)
HGB BLD-MCNC: 14 G/DL (ref 12–16)
IMM GRANULOCYTES # BLD AUTO: 0.11 K/UL (ref 0–0.11)
IMM GRANULOCYTES NFR BLD AUTO: 0.9 % (ref 0–0.9)
KETONES UR STRIP.AUTO-MCNC: 15 MG/DL
LACTATE BLD-SCNC: 3.63 MMOL/L (ref 0.5–2)
LEUKOCYTE ESTERASE UR QL STRIP.AUTO: NEGATIVE
LIPASE SERPL-CCNC: 14 U/L (ref 7–58)
LYMPHOCYTES # BLD AUTO: 1.31 K/UL (ref 1–4.8)
LYMPHOCYTES NFR BLD: 10.4 % (ref 22–41)
MCH RBC QN AUTO: 30 PG (ref 27–33)
MCHC RBC AUTO-ENTMCNC: 34.7 G/DL (ref 33.6–35)
MCV RBC AUTO: 86.5 FL (ref 81.4–97.8)
MICRO URNS: ABNORMAL
MONOCYTES # BLD AUTO: 0.59 K/UL (ref 0–0.85)
MONOCYTES NFR BLD AUTO: 4.7 % (ref 0–13.4)
NEUTROPHILS # BLD AUTO: 10.56 K/UL (ref 2–7.15)
NEUTROPHILS NFR BLD: 83.5 % (ref 44–72)
NITRITE UR QL STRIP.AUTO: NEGATIVE
NRBC # BLD AUTO: 0 K/UL
NRBC BLD-RTO: 0 /100 WBC
PH UR STRIP.AUTO: 7 [PH]
PLATELET # BLD AUTO: 287 K/UL (ref 164–446)
PMV BLD AUTO: 9.3 FL (ref 9–12.9)
POTASSIUM SERPL-SCNC: 3.5 MMOL/L (ref 3.6–5.5)
PROT SERPL-MCNC: 8.2 G/DL (ref 6–8.2)
PROT UR QL STRIP: NEGATIVE MG/DL
RBC # BLD AUTO: 4.66 M/UL (ref 4.2–5.4)
RBC # URNS HPF: ABNORMAL /HPF
RBC UR QL AUTO: ABNORMAL
SODIUM SERPL-SCNC: 137 MMOL/L (ref 135–145)
SP GR UR STRIP.AUTO: 1.02
WBC # BLD AUTO: 12.6 K/UL (ref 4.8–10.8)
WBC #/AREA URNS HPF: ABNORMAL /HPF

## 2018-02-06 PROCEDURE — 700105 HCHG RX REV CODE 258: Performed by: EMERGENCY MEDICINE

## 2018-02-06 PROCEDURE — 80053 COMPREHEN METABOLIC PANEL: CPT

## 2018-02-06 PROCEDURE — 83690 ASSAY OF LIPASE: CPT

## 2018-02-06 PROCEDURE — 96375 TX/PRO/DX INJ NEW DRUG ADDON: CPT

## 2018-02-06 PROCEDURE — 74176 CT ABD & PELVIS W/O CONTRAST: CPT

## 2018-02-06 PROCEDURE — 83605 ASSAY OF LACTIC ACID: CPT

## 2018-02-06 PROCEDURE — 84703 CHORIONIC GONADOTROPIN ASSAY: CPT

## 2018-02-06 PROCEDURE — 36415 COLL VENOUS BLD VENIPUNCTURE: CPT

## 2018-02-06 PROCEDURE — 71045 X-RAY EXAM CHEST 1 VIEW: CPT

## 2018-02-06 PROCEDURE — 96374 THER/PROPH/DIAG INJ IV PUSH: CPT

## 2018-02-06 PROCEDURE — 85025 COMPLETE CBC W/AUTO DIFF WBC: CPT

## 2018-02-06 PROCEDURE — 87040 BLOOD CULTURE FOR BACTERIA: CPT

## 2018-02-06 PROCEDURE — 700111 HCHG RX REV CODE 636 W/ 250 OVERRIDE (IP): Performed by: EMERGENCY MEDICINE

## 2018-02-06 PROCEDURE — 87086 URINE CULTURE/COLONY COUNT: CPT

## 2018-02-06 PROCEDURE — 96361 HYDRATE IV INFUSION ADD-ON: CPT

## 2018-02-06 PROCEDURE — 81001 URINALYSIS AUTO W/SCOPE: CPT

## 2018-02-06 PROCEDURE — 99284 EMERGENCY DEPT VISIT MOD MDM: CPT

## 2018-02-06 RX ORDER — SODIUM CHLORIDE 9 MG/ML
1000 INJECTION, SOLUTION INTRAVENOUS ONCE
Status: COMPLETED | OUTPATIENT
Start: 2018-02-06 | End: 2018-02-06

## 2018-02-06 RX ORDER — ONDANSETRON 2 MG/ML
4 INJECTION INTRAMUSCULAR; INTRAVENOUS ONCE
Status: COMPLETED | OUTPATIENT
Start: 2018-02-06 | End: 2018-02-06

## 2018-02-06 RX ORDER — IBUPROFEN 200 MG
400 TABLET ORAL EVERY 6 HOURS PRN
Status: SHIPPED | COMMUNITY
End: 2018-04-18

## 2018-02-06 RX ORDER — KETOROLAC TROMETHAMINE 10 MG/1
10 TABLET, FILM COATED ORAL EVERY 6 HOURS PRN
Qty: 20 TAB | Refills: 0 | Status: SHIPPED | OUTPATIENT
Start: 2018-02-06 | End: 2018-02-08 | Stop reason: SINTOL

## 2018-02-06 RX ORDER — KETOROLAC TROMETHAMINE 30 MG/ML
30 INJECTION, SOLUTION INTRAMUSCULAR; INTRAVENOUS ONCE
Status: COMPLETED | OUTPATIENT
Start: 2018-02-06 | End: 2018-02-06

## 2018-02-06 RX ORDER — NITROFURANTOIN 25; 75 MG/1; MG/1
100 CAPSULE ORAL 2 TIMES DAILY
Status: SHIPPED | COMMUNITY
Start: 2018-02-05 | End: 2018-04-18

## 2018-02-06 RX ORDER — ONDANSETRON 4 MG/1
4 TABLET, ORALLY DISINTEGRATING ORAL EVERY 8 HOURS PRN
Qty: 20 TAB | Refills: 0 | Status: SHIPPED | OUTPATIENT
Start: 2018-02-06 | End: 2018-04-18

## 2018-02-06 RX ADMIN — ONDANSETRON 4 MG: 2 INJECTION INTRAMUSCULAR; INTRAVENOUS at 12:48

## 2018-02-06 RX ADMIN — KETOROLAC TROMETHAMINE 30 MG: 30 INJECTION, SOLUTION INTRAMUSCULAR at 12:49

## 2018-02-06 RX ADMIN — SODIUM CHLORIDE 1000 ML: 9 INJECTION, SOLUTION INTRAVENOUS at 12:48

## 2018-02-06 ASSESSMENT — PAIN SCALES - GENERAL: PAINLEVEL_OUTOF10: 8

## 2018-02-06 NOTE — ED NOTES
assisted to br for void, pt with c/o low back pain. Urine spec obtained.saline lock upon return to room. Aware of pending meds and ct. 9/10  Rt side and back pain

## 2018-02-06 NOTE — ED PROVIDER NOTES
ED Provider Note    CHIEF COMPLAINT  Chief Complaint   Patient presents with   • Back Pain   • Flank Pain   • Urinary Pain         HPI  Amy Correa is a 32 y.o. female who presents to ED with right flank pain. The patient states a few days ago the patient started developing severe right-sided flank pain, radiates down to the right lower quadrant, electrical, comes in waves, with associated nausea vomiting. She is having some urinary frequency, some blood in her urine. Was seen at urgent care yesterday, had blood in her urine, small leukocytes, they felt like she had a urinary tract infection so they gave the patient Macrobid. The patient states that now she is vomiting up her antibiotics. She has never had this before.    REVIEW OF SYSTEMS  See HPI for further details. All other systems are negative.     PAST MEDICAL HISTORY  Past Medical History:   Diagnosis Date   • Known health problems: none    • Microcytic anemia 6/29/2017   • Subclinical hypothyroidism 6/29/2017       FAMILY HISTORY  Family History   Problem Relation Age of Onset   • Diabetes Maternal Uncle    • Diabetes Maternal Grandmother    • No Known Problems Mother    • No Known Problems Sister    • Heart Disease Maternal Grandfather    • No Known Problems Paternal Grandmother    • No Known Problems Paternal Grandfather        SOCIAL HISTORY  Social History     Social History   • Marital status:      Spouse name: N/A   • Number of children: N/A   • Years of education: N/A     Social History Main Topics   • Smoking status: Never Smoker   • Smokeless tobacco: Never Used   • Alcohol use 0.0 oz/week      Comment: occ   • Drug use: No   • Sexual activity: Yes     Partners: Male     Birth control/ protection: Condom     Other Topics Concern   • Not on file     Social History Narrative   • No narrative on file       SURGICAL HISTORY  Past Surgical History:   Procedure Laterality Date   • DILATION AND CURETTAGE      2011, missed carriage   •  HERNIA REPAIR      9 yrs old, umbilical hernia       CURRENT MEDICATIONS  Home Medications     Reviewed by Horace Molina (Pharmacy Tech) on 02/06/18 at 1243  Med List Status: Complete   Medication Last Dose Status   ibuprofen (MOTRIN) 200 MG Tab 2/5/2018 Active   nitrofurantoin monohydr macro (MACROBID) 100 MG Cap 2/6/2018 Active   Non Formulary Request 2/3/2018 Active   Probiotic Product (PROBIOTIC PO) 2/3/2018 Active                ALLERGIES  Allergies   Allergen Reactions   • Penicillins Rash       PHYSICAL EXAM  VITAL SIGNS: /58   Pulse 71 Comment: Simultaneous filing. User may not have seen previous data.  Temp 36.9 °C (98.5 °F)   Resp 18   Wt 91.2 kg (201 lb)   LMP 01/29/2018   SpO2 95% Comment: Simultaneous filing. User may not have seen previous data.  BMI 36.76 kg/m²   Constitutional: Well developed, Well nourished, moderate distress, Non-toxic appearance.   HENT: Normocephalic, Atraumatic, Bilateral external ears normal, Oropharynx clear with dry mucous membranes, No oral exudates, Nose normal.   Eyes: PERRLA, EOMI, Conjunctiva normal, No discharge.   Neck: Normal range of motion, No tenderness, Supple, No stridor.   Lymphatic: No lymphadenopathy noted.   Cardiovascular: Normal heart rate, Normal rhythm.   Thorax & Lungs: Normal breath sounds, No respiratory distress, No wheezing, No chest tenderness.   Abdomen: Soft, nontender, no rebound, no peritoneal signs  Skin: Warm, Dry, No erythema, No rash.   Back: No tenderness, No CVA tenderness.   Extremities: Intact distal pulses, No edema, No tenderness.   Neurologic: Alert & oriented x 3, moves all extremities spontaneously.   Psych: Anxious and hyperventilating    RADIOLOGY/PROCEDURES  CT-RENAL COLIC EVALUATION(A/P W/O)   Final Result      1.  5 mm right mid ureteral stone with moderate hydronephrosis above that level.      2.  No evidence of bowel obstruction or inflammatory change.      DX-CHEST-PORTABLE (1 VIEW)   Final Result       No evidence of acute cardiopulmonary process.            COURSE & MEDICAL DECISION MAKING  Pertinent Labs & Imaging studies reviewed. (See chart for details)  Patient is coming in with signs and symptoms concerning for a kidney stone, reviewed the patient's dip urinalysis is primarily bloody, we'll get a CT scan of abdomen and pelvis, give the patient IV fluids due to her dry mucous membranes and nausea vomiting. Give the patient some Zofran and Toradol.    Patient feeling improved after IV fluids and Toradol. The patient's has an elevated lactic acid however did not see any evidence of sepsis, the patient does have a 5 mm stone on the right, lactic acidosis stress and vomiting. Patient was given a liter normal saline, Toradol, feeling improved pain is under control with distress patient on Toradol, Zofran, have the patient follow-up with Dr. Seo as an outpatient, have the patient return with increasing pain fevers vomiting or any other concerns.    FINAL IMPRESSION  1. Kidney stone        Patient referred to primary care provider for blood pressure management    This dictation was created using voice recognition software. The accuracy of the dictation is limited to the abilities of the software. I expect there may be some errors of grammar and possibly content. The nursing notes were reviewed and certain aspects of this information were incorporated into this note.    Electronically signed by: Nehemiah Meyers, 2/6/2018 12:28 PM

## 2018-02-06 NOTE — DISCHARGE INSTRUCTIONS
Please follow-up with your primary care provider for blood pressure management.        Kidney Stones  Kidney stones (urolithiasis) are deposits that form inside your kidneys. The intense pain is caused by the stone moving through the urinary tract. When the stone moves, the ureter goes into spasm around the stone. The stone is usually passed in the urine.   CAUSES   · A disorder that makes certain neck glands produce too much parathyroid hormone (primary hyperparathyroidism).  · A buildup of uric acid crystals, similar to gout in your joints.  · Narrowing (stricture) of the ureter.  · A kidney obstruction present at birth (congenital obstruction).  · Previous surgery on the kidney or ureters.  · Numerous kidney infections.  SYMPTOMS   · Feeling sick to your stomach (nauseous).  · Throwing up (vomiting).  · Blood in the urine (hematuria).  · Pain that usually spreads (radiates) to the groin.  · Frequency or urgency of urination.  DIAGNOSIS   · Taking a history and physical exam.  · Blood or urine tests.  · CT scan.  · Occasionally, an examination of the inside of the urinary bladder (cystoscopy) is performed.  TREATMENT   · Observation.  · Increasing your fluid intake.  · Extracorporeal shock wave lithotripsy--This is a noninvasive procedure that uses shock waves to break up kidney stones.  · Surgery may be needed if you have severe pain or persistent obstruction. There are various surgical procedures. Most of the procedures are performed with the use of small instruments. Only small incisions are needed to accommodate these instruments, so recovery time is minimized.  The size, location, and chemical composition are all important variables that will determine the proper choice of action for you. Talk to your health care provider to better understand your situation so that you will minimize the risk of injury to yourself and your kidney.   HOME CARE INSTRUCTIONS   · Drink enough water and fluids to keep your urine  clear or pale yellow. This will help you to pass the stone or stone fragments.  · Strain all urine through the provided strainer. Keep all particulate matter and stones for your health care provider to see. The stone causing the pain may be as small as a grain of salt. It is very important to use the strainer each and every time you pass your urine. The collection of your stone will allow your health care provider to analyze it and verify that a stone has actually passed. The stone analysis will often identify what you can do to reduce the incidence of recurrences.  · Only take over-the-counter or prescription medicines for pain, discomfort, or fever as directed by your health care provider.  · Make a follow-up appointment with your health care provider as directed.  · Get follow-up X-rays if required. The absence of pain does not always mean that the stone has passed. It may have only stopped moving. If the urine remains completely obstructed, it can cause loss of kidney function or even complete destruction of the kidney. It is your responsibility to make sure X-rays and follow-ups are completed. Ultrasounds of the kidney can show blockages and the status of the kidney. Ultrasounds are not associated with any radiation and can be performed easily in a matter of minutes.  SEEK MEDICAL CARE IF:  · You experience pain that is progressive and unresponsive to any pain medicine you have been prescribed.  SEEK IMMEDIATE MEDICAL CARE IF:   · Pain cannot be controlled with the prescribed medicine.  · You have a fever or shaking chills.  · The severity or intensity of pain increases over 18 hours and is not relieved by pain medicine.  · You develop a new onset of abdominal pain.  · You feel faint or pass out.  · You are unable to urinate.  MAKE SURE YOU:   · Understand these instructions.  · Will watch your condition.  · Will get help right away if you are not doing well or get worse.     This information is not intended to  replace advice given to you by your health care provider. Make sure you discuss any questions you have with your health care provider.     Document Released: 12/18/2006 Document Revised: 01/08/2016 Document Reviewed: 05/21/2014  [employer] Patient Education ©2016 [employer]

## 2018-02-06 NOTE — ED NOTES
Dc instructions given. Pt to f/u with pcp and urology, return for worsening s/s. Work note provided per request. To  prescriptions at [facility] and aware of same

## 2018-02-06 NOTE — ED NOTES
Started on antibiotics for UTI. Now has back pain and shaking chills. Vomited second dose of antibiotics.

## 2018-02-07 ENCOUNTER — PATIENT OUTREACH (OUTPATIENT)
Dept: HEALTH INFORMATION MANAGEMENT | Facility: OTHER | Age: 33
End: 2018-02-07

## 2018-02-07 NOTE — PROGRESS NOTES
2/7/18 at 3:25 PM--Received phone call from pt.  Pt was discharged from [facility] ER 2/6/18.  Pt states she is having some diarrhea and feels this is due to taking Macrobid Rx.  Instructed pt to contact her PCP to see if it's ok that she take something over-the-counter for her symptom of diarrhea.  Pt verbalizes understanding and states she will contact her PCP.

## 2018-02-07 NOTE — ED NOTES
"recvd call from family regarding toradol dosing.spouse states pt is having pain and they \"request something stronger\". This rn spoke with erp who recommended pt try tylenol 650 mg po along with toradol, not to exceed 3000mg of same in 24 hrs. Family also encouraged to return to er if pt remains uncomfortable  "

## 2018-02-08 ENCOUNTER — OFFICE VISIT (OUTPATIENT)
Dept: MEDICAL GROUP | Age: 33
End: 2018-02-08
Payer: COMMERCIAL

## 2018-02-08 VITALS
WEIGHT: 203 LBS | HEART RATE: 99 BPM | HEIGHT: 62 IN | DIASTOLIC BLOOD PRESSURE: 80 MMHG | BODY MASS INDEX: 37.36 KG/M2 | TEMPERATURE: 97.5 F | OXYGEN SATURATION: 98 % | SYSTOLIC BLOOD PRESSURE: 104 MMHG

## 2018-02-08 DIAGNOSIS — E66.9 OBESITY (BMI 35.0-39.9 WITHOUT COMORBIDITY): ICD-10-CM

## 2018-02-08 DIAGNOSIS — N13.2 HYDRONEPHROSIS WITH URINARY OBSTRUCTION DUE TO URETERAL CALCULUS: ICD-10-CM

## 2018-02-08 DIAGNOSIS — N30.00 ACUTE CYSTITIS WITHOUT HEMATURIA: ICD-10-CM

## 2018-02-08 DIAGNOSIS — N20.1 RIGHT URETERAL STONE: ICD-10-CM

## 2018-02-08 LAB
BACTERIA UR CULT: NORMAL
SIGNIFICANT IND 70042: NORMAL
SITE SITE: NORMAL
SOURCE SOURCE: NORMAL

## 2018-02-08 PROCEDURE — 99203 OFFICE O/P NEW LOW 30 MIN: CPT | Performed by: INTERNAL MEDICINE

## 2018-02-08 RX ORDER — HYDROCODONE BITARTRATE AND ACETAMINOPHEN 5; 325 MG/1; MG/1
1-2 TABLET ORAL EVERY 4 HOURS PRN
Qty: 20 TAB | Refills: 0 | Status: SHIPPED | OUTPATIENT
Start: 2018-02-08 | End: 2018-02-18

## 2018-02-08 ASSESSMENT — ENCOUNTER SYMPTOMS
EYES NEGATIVE: 1
PSYCHIATRIC NEGATIVE: 1
CONSTITUTIONAL NEGATIVE: 1
CARDIOVASCULAR NEGATIVE: 1
RESPIRATORY NEGATIVE: 1
MUSCULOSKELETAL NEGATIVE: 1
GASTROINTESTINAL NEGATIVE: 1
NEUROLOGICAL NEGATIVE: 1

## 2018-02-08 NOTE — PROGRESS NOTES
"Subjective:      Amy Correa is a 32 y.o. female who presents with Hospital Follow-up   from ER for right ureteral stone seen on CT- no further flank pain. Got loose stool from toradol lower . rxd macrodantin. Has urology appt next month. No dysuria        And   The patient is here for followup of chronic medical problems listed below. The patient is compliant with medications and having no side effects from them. Denies chest pain, abdominal pain, dyspnea, myalgias, or cough.   Patient Active Problem List    Diagnosis Date Noted   • Right ureteral stone- dr kc 02/08/2018   • Hydronephrosis with urinary obstruction due to ureteral calculus 02/08/2018   • Acute cystitis without hematuria 02/05/2018   • Acute right-sided low back pain without sciatica 02/05/2018   • Obesity (BMI 35.0-39.9 without comorbidity) ([facility]) 01/25/2018   • Microcytic anemia 06/29/2017   • Subclinical hypothyroidism 06/29/2017   • Obesity (BMI 30-39.9) 06/26/2017     Allergies   Allergen Reactions   • Penicillins Rash       HPI    Review of Systems   Constitutional: Negative.    HENT: Negative.    Eyes: Negative.    Respiratory: Negative.    Cardiovascular: Negative.    Gastrointestinal: Negative.    Genitourinary: Negative.    Musculoskeletal: Negative.    Skin: Negative.    Neurological: Negative.    Endo/Heme/Allergies: Negative.    Psychiatric/Behavioral: Negative.           Objective:     /80   Pulse 99   Temp 36.4 °C (97.5 °F)   Ht 1.575 m (5' 2.01\")   Wt 92.1 kg (203 lb)   LMP 01/29/2018   SpO2 98%   Breastfeeding? No   BMI 37.12 kg/m²      Physical Exam   Constitutional: She is oriented to person, place, and time. She appears well-developed and well-nourished. No distress.   HENT:   Head: Normocephalic and atraumatic.   Right Ear: External ear normal.   Left Ear: External ear normal.   Nose: Nose normal.   Mouth/Throat: Oropharynx is clear and moist. No oropharyngeal exudate.   Eyes: Conjunctivae and EOM " are normal. Pupils are equal, round, and reactive to light. Right eye exhibits no discharge. Left eye exhibits no discharge. No scleral icterus.   Neck: Normal range of motion. Neck supple. No JVD present. No tracheal deviation present. No thyromegaly present.   Cardiovascular: Normal rate, regular rhythm, normal heart sounds and intact distal pulses.  Exam reveals no gallop and no friction rub.    No murmur heard.  Pulmonary/Chest: Effort normal and breath sounds normal. No stridor. No respiratory distress. She has no wheezes. She has no rales. She exhibits no tenderness.   Abdominal: Soft. Bowel sounds are normal. She exhibits no distension and no mass. There is no tenderness. There is no rebound and no guarding.   Musculoskeletal: Normal range of motion. She exhibits no edema or tenderness.   Lymphadenopathy:     She has no cervical adenopathy.   Neurological: She is alert and oriented to person, place, and time. She has normal reflexes. She displays normal reflexes. No cranial nerve deficit. She exhibits normal muscle tone. Coordination normal.   Skin: Skin is warm and dry. No rash noted. She is not diaphoretic. No erythema. No pallor.   Psychiatric: She has a normal mood and affect. Her behavior is normal. Judgment and thought content normal.   Vitals reviewed.    Admission on 02/06/2018, Discharged on 02/06/2018   Component Date Value   • Color 02/06/2018 Yellow    • Character 02/06/2018 Clear    • Specific Gravity 02/06/2018 1.020    • Ph 02/06/2018 7.0    • Glucose 02/06/2018 Negative    • Ketones 02/06/2018 15*   • Protein 02/06/2018 Negative    • Bilirubin 02/06/2018 Negative    • Nitrite 02/06/2018 Negative    • Leukocyte Esterase 02/06/2018 Negative    • Occult Blood 02/06/2018 Large*   • Micro Urine Req 02/06/2018 Microscopic    • Significant Indicator 02/06/2018 NEG    • Source 02/06/2018 UR    • Urine Culture 02/06/2018 Mixed skin gio >100,000 cfu/mL    • Significant Indicator 02/06/2018 NEG    •  Source 02/06/2018 BLD    • Site 02/06/2018 PERIPHERAL    • Blood Culture 02/06/2018                      Value:No Growth    Note: Blood cultures are incubated for 5 days and  are monitored continuously.Positive blood cultures  are called to the RN and reported as soon as  they are identified.  Blood culture testing and Gram stain, if indicated, are  performed at [facility], [address] [place] [address].  Positive blood cultures are  sent to [facility], [address] [place] [address], for organism identification and  susceptibility testing.     • Significant Indicator 02/06/2018 NEG    • Source 02/06/2018 BLD    • Site 02/06/2018 PERIPHERAL    • Blood Culture 02/06/2018                      Value:No Growth    Note: Blood cultures are incubated for 5 days and  are monitored continuously.Positive blood cultures  are called to the RN and reported as soon as  they are identified.  Blood culture testing and Gram stain, if indicated, are  performed at [facility], [address] [place] [address].  Positive blood cultures are  sent to [facility], [address] [place] [address], for organism identification and  susceptibility testing.     • Sodium 02/06/2018 137    • Potassium 02/06/2018 3.5*   • Chloride 02/06/2018 104    • Co2 02/06/2018 19*   • Anion Gap 02/06/2018 14.0*   • Glucose 02/06/2018 112*   • Bun 02/06/2018 13    • Creatinine 02/06/2018 0.81    • Calcium 02/06/2018 9.0    • AST(SGOT) 02/06/2018 43    • ALT(SGPT) 02/06/2018 18    • Alkaline Phosphatase 02/06/2018 61    • Total Bilirubin 02/06/2018 0.6    • Albumin 02/06/2018 4.4    • Total Protein 02/06/2018 8.2    • Globulin 02/06/2018 3.8*   • A-G Ratio 02/06/2018 1.2    • WBC 02/06/2018 12.6*   • RBC 02/06/2018 4.66    • Hemoglobin 02/06/2018 14.0    • Hematocrit 02/06/2018 40.3    • MCV 02/06/2018 86.5    • MCH 02/06/2018 30.0    • MCHC 02/06/2018 34.7    •  RDW 02/06/2018 40.4    • Platelet Count 02/06/2018 287    • MPV 02/06/2018 9.3    • Neutrophils-Polys 02/06/2018 83.50*   • Lymphocytes 02/06/2018 10.40*   • Monocytes 02/06/2018 4.70    • Eosinophils 02/06/2018 0.20    • Basophils 02/06/2018 0.30    • Immature Granulocytes 02/06/2018 0.90    • Nucleated RBC 02/06/2018 0.00    • Neutrophils (Absolute) 02/06/2018 10.56*   • Lymphs (Absolute) 02/06/2018 1.31    • Monos (Absolute) 02/06/2018 0.59    • Eos (Absolute) 02/06/2018 0.03    • Baso (Absolute) 02/06/2018 0.04    • Immature Granulocytes (a* 02/06/2018 0.11    • NRBC (Absolute) 02/06/2018 0.00    • Lactic Acid 02/06/2018 3.63*   • Lipase 02/06/2018 14    • GFR If  02/06/2018 >60    • GFR If Non  Ameri* 02/06/2018 >60    • Beta-Hcg Qualitative Ser* 02/06/2018 Negative    • WBC 02/06/2018 0-2    • RBC 02/06/2018 *   • Bacteria 02/06/2018 Few*   • Epithelial Cells 02/06/2018 Moderate*   Office Visit on 02/05/2018   Component Date Value   • POC Color 02/05/2018 Gold    • POC Appearance 02/05/2018 Cloudy    • POC Leukocyte Esterase 02/05/2018 Small    • POC Nitrites 02/05/2018 Negative    • POC Urobiligen 02/05/2018 0.2    • POC Protein 02/05/2018 Trace    • POC Urine PH 02/05/2018 6.5    • POC Blood 02/05/2018 Hemolyzed    • POC Specific Gravity 02/05/2018 1.025    • POC Ketones 02/05/2018 Negative    • POC Biliruben 02/05/2018 Negative    • POC Glucose 02/05/2018 Negative       Lab Results   Component Value Date/Time    HBA1C 5.0 06/28/2017 07:37 AM     Lab Results   Component Value Date/Time    SODIUM 137 02/06/2018 12:05 PM    POTASSIUM 3.5 (L) 02/06/2018 12:05 PM    CHLORIDE 104 02/06/2018 12:05 PM    CO2 19 (L) 02/06/2018 12:05 PM    GLUCOSE 112 (H) 02/06/2018 12:05 PM    BUN 13 02/06/2018 12:05 PM    CREATININE 0.81 02/06/2018 12:05 PM    ALKPHOSPHAT 61 02/06/2018 12:05 PM    ASTSGOT 43 02/06/2018 12:05 PM    ALTSGPT 18 02/06/2018 12:05 PM    TBILIRUBIN 0.6 02/06/2018 12:05 PM      No results found for: INR  Lab Results   Component Value Date/Time    CHOLSTRLTOT 119 06/28/2017 07:37 AM    LDL 68 06/28/2017 07:37 AM    HDL 41 06/28/2017 07:37 AM    TRIGLYCERIDE 52 06/28/2017 07:37 AM       No results found for: TESTOSTERONE  No results found for: TSH  Lab Results   Component Value Date/Time    FREET4 0.87 06/28/2017 07:37 AM     Lab Results   Component Value Date/Time    URICACID 2.8 06/27/2014 02:30 PM     No components found for: VITB12  Lab Results   Component Value Date/Time    25HYDROXY 30 06/28/2017 07:37 AM               Assessment/Plan:     1. Right ureteral stone- no further pain ; prob passed       HYDROcodone-acetaminophen (NORCO) 5-325 MG Tab per tablet; Take 1-2 Tabs by mouth every four hours as needed (for  pain) for up to 10 days.  Dispense: 20 Tab; Refill: 0  - CONSENT FOR OPIATE PRESCRIPTION    2. Hydronephrosis with urinary obstruction due to ureteral calculus    As above ref to urology pend  - HYDROcodone-acetaminophen (NORCO) 5-325 MG Tab per tablet; Take 1-2 Tabs by mouth every four hours as needed (for  pain) for up to 10 days.  Dispense: 20 Tab; Refill: 0  - CONSENT FOR OPIATE PRESCRIPTION    3. Acute cystitis without hematuria      Finish macrobid    4. Obesity (BMI 35.0-39.9 without comorbidity) ([facility])    diet/exercise/lose 15 lbs.; patient counseled         30 minute face-to-face encounter took place today.  More than half of this time was spent in the coordination of care of the above problems, as well as counseling.

## 2018-02-11 LAB
BACTERIA BLD CULT: NORMAL
BACTERIA BLD CULT: NORMAL
SIGNIFICANT IND 70042: NORMAL
SIGNIFICANT IND 70042: NORMAL
SITE SITE: NORMAL
SITE SITE: NORMAL
SOURCE SOURCE: NORMAL
SOURCE SOURCE: NORMAL

## 2018-04-18 DIAGNOSIS — Z01.812 PRE-OPERATIVE LABORATORY EXAMINATION: ICD-10-CM

## 2018-04-18 LAB
ALBUMIN SERPL BCP-MCNC: 4.1 G/DL (ref 3.2–4.9)
ALBUMIN/GLOB SERPL: 1.2 G/DL
ALP SERPL-CCNC: 61 U/L (ref 30–99)
ALT SERPL-CCNC: 13 U/L (ref 2–50)
ANION GAP SERPL CALC-SCNC: 7 MMOL/L (ref 0–11.9)
APPEARANCE UR: CLEAR
AST SERPL-CCNC: 27 U/L (ref 12–45)
BACTERIA #/AREA URNS HPF: ABNORMAL /HPF
BILIRUB SERPL-MCNC: 0.4 MG/DL (ref 0.1–1.5)
BILIRUB UR QL STRIP.AUTO: NEGATIVE
BUN SERPL-MCNC: 17 MG/DL (ref 8–22)
CALCIUM SERPL-MCNC: 8.9 MG/DL (ref 8.5–10.5)
CHLORIDE SERPL-SCNC: 106 MMOL/L (ref 96–112)
CO2 SERPL-SCNC: 27 MMOL/L (ref 20–33)
COLOR UR: YELLOW
CREAT SERPL-MCNC: 0.59 MG/DL (ref 0.5–1.4)
EPI CELLS #/AREA URNS HPF: ABNORMAL /HPF
ERYTHROCYTE [DISTWIDTH] IN BLOOD BY AUTOMATED COUNT: 42.3 FL (ref 35.9–50)
GLOBULIN SER CALC-MCNC: 3.4 G/DL (ref 1.9–3.5)
GLUCOSE SERPL-MCNC: 101 MG/DL (ref 65–99)
GLUCOSE UR STRIP.AUTO-MCNC: NEGATIVE MG/DL
HCT VFR BLD AUTO: 38.5 % (ref 37–47)
HGB BLD-MCNC: 12.3 G/DL (ref 12–16)
HYALINE CASTS #/AREA URNS LPF: ABNORMAL /LPF
KETONES UR STRIP.AUTO-MCNC: NEGATIVE MG/DL
LEUKOCYTE ESTERASE UR QL STRIP.AUTO: ABNORMAL
MCH RBC QN AUTO: 29 PG (ref 27–33)
MCHC RBC AUTO-ENTMCNC: 31.9 G/DL (ref 33.6–35)
MCV RBC AUTO: 90.8 FL (ref 81.4–97.8)
MICRO URNS: ABNORMAL
NITRITE UR QL STRIP.AUTO: NEGATIVE
PH UR STRIP.AUTO: 7 [PH]
PLATELET # BLD AUTO: 301 K/UL (ref 164–446)
PMV BLD AUTO: 10.1 FL (ref 9–12.9)
POTASSIUM SERPL-SCNC: 3.6 MMOL/L (ref 3.6–5.5)
PROT SERPL-MCNC: 7.5 G/DL (ref 6–8.2)
PROT UR QL STRIP: NEGATIVE MG/DL
RBC # BLD AUTO: 4.24 M/UL (ref 4.2–5.4)
RBC # URNS HPF: ABNORMAL /HPF
RBC UR QL AUTO: ABNORMAL
SODIUM SERPL-SCNC: 140 MMOL/L (ref 135–145)
SP GR UR STRIP.AUTO: 1.02
UROBILINOGEN UR STRIP.AUTO-MCNC: 0.2 MG/DL
WBC # BLD AUTO: 8.5 K/UL (ref 4.8–10.8)
WBC #/AREA URNS HPF: ABNORMAL /HPF

## 2018-04-18 PROCEDURE — 85027 COMPLETE CBC AUTOMATED: CPT

## 2018-04-18 PROCEDURE — 81001 URINALYSIS AUTO W/SCOPE: CPT

## 2018-04-18 PROCEDURE — 87086 URINE CULTURE/COLONY COUNT: CPT

## 2018-04-18 PROCEDURE — 80053 COMPREHEN METABOLIC PANEL: CPT

## 2018-04-18 PROCEDURE — 36415 COLL VENOUS BLD VENIPUNCTURE: CPT

## 2018-04-18 RX ORDER — POTASSIUM CITRATE 10 MEQ/1
10 TABLET, EXTENDED RELEASE ORAL 2 TIMES DAILY
COMMUNITY
End: 2020-02-21

## 2018-04-18 RX ORDER — KETOROLAC TROMETHAMINE 10 MG/1
10 TABLET, FILM COATED ORAL EVERY 6 HOURS PRN
COMMUNITY
End: 2020-02-21

## 2018-04-18 RX ORDER — ONDANSETRON 4 MG/1
4 TABLET, ORALLY DISINTEGRATING ORAL EVERY 8 HOURS PRN
COMMUNITY
End: 2020-02-21

## 2018-04-18 RX ORDER — HYDROCODONE BITARTRATE AND ACETAMINOPHEN 5; 325 MG/1; MG/1
1-2 TABLET ORAL EVERY 4 HOURS PRN
Status: ON HOLD | COMMUNITY
End: 2018-05-07

## 2018-04-18 NOTE — OR NURSING
PreAdmit Appointment: Patient given Preparing for your procedure handout. Patient instructed to continue regularly prescribed medications through day before surgery. Instructed to take the following medications the day of surgery with a sip of water per Anesthesia protocol: Potassium, Norco if needed, Toradol if needed.

## 2018-04-20 LAB
BACTERIA UR CULT: NORMAL
SIGNIFICANT IND 70042: NORMAL
SITE SITE: NORMAL
SOURCE SOURCE: NORMAL

## 2018-04-26 ENCOUNTER — OFFICE VISIT (OUTPATIENT)
Dept: URGENT CARE | Facility: CLINIC | Age: 33
End: 2018-04-26
Payer: COMMERCIAL

## 2018-04-26 VITALS
HEART RATE: 105 BPM | RESPIRATION RATE: 16 BRPM | WEIGHT: 203 LBS | TEMPERATURE: 98.4 F | BODY MASS INDEX: 38.33 KG/M2 | SYSTOLIC BLOOD PRESSURE: 120 MMHG | OXYGEN SATURATION: 97 % | DIASTOLIC BLOOD PRESSURE: 90 MMHG | HEIGHT: 61 IN

## 2018-04-26 DIAGNOSIS — J02.9 SORE THROAT: ICD-10-CM

## 2018-04-26 DIAGNOSIS — J02.9 VIRAL PHARYNGITIS: ICD-10-CM

## 2018-04-26 LAB
INT CON NEG: NORMAL
INT CON POS: NORMAL
S PYO AG THROAT QL: NEGATIVE

## 2018-04-26 PROCEDURE — 99213 OFFICE O/P EST LOW 20 MIN: CPT | Performed by: NURSE PRACTITIONER

## 2018-04-26 PROCEDURE — 87880 STREP A ASSAY W/OPTIC: CPT | Performed by: NURSE PRACTITIONER

## 2018-04-26 ASSESSMENT — ENCOUNTER SYMPTOMS
SWOLLEN GLANDS: 1
HOARSE VOICE: 1
COUGH: 0
SORE THROAT: 1

## 2018-04-26 NOTE — PROGRESS NOTES
Subjective:      Amy Correa is a 32 y.o. female who presents with Pharyngitis (mostly dry back of throat, suspects strep or pos allergies, having surgery 5/7 and concern)            Pharyngitis    This is a new problem. Episode onset: Pt reports one day of sore throat. 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 When she developed a scratchy, dry throat yesterday, she was concerned it may be strep. Denies any fever or chills. The problem has been unchanged. Neither side of throat is experiencing more pain than the other. There has been no fever. The pain is moderate. Associated symptoms include congestion, a hoarse voice and swollen glands. Pertinent negatives include no coughing. She has tried acetaminophen and cool liquids for the symptoms. The treatment provided no relief.       Review of Systems   HENT: Positive for congestion, hoarse voice and sore throat.    Respiratory: Negative for cough.    All other systems reviewed and are negative.    Past Medical History:   Diagnosis Date   • Known health problems: none    • Microcytic anemia 6/29/2017   • Subclinical hypothyroidism 6/29/2017   • Urinary incontinence       Past Surgical History:   Procedure Laterality Date   • DILATION AND CURETTAGE      2011, missed carriage   • HERNIA REPAIR      9 yrs old, umbilical hernia      Social History     Social History   • Marital status:      Spouse name: N/A   • Number of children: N/A   • Years of education: N/A     Occupational History   • Not on file.     Social History Main Topics   • Smoking status: Never Smoker   • Smokeless tobacco: Never Used   • Alcohol use 0.0 oz/week      Comment: 1 per month   • Drug use: No   • Sexual activity: Yes     Partners: Male     Birth control/ protection: Condom     Other Topics Concern   • Not on file     Social History Narrative   • No narrative on file          Objective:     /90   Pulse (!) 105   Temp 36.9 °C (98.4  "°F)   Resp 16   Ht 1.549 m (5' 1\")   Wt 92.1 kg (203 lb)   LMP 04/15/2018   SpO2 97%   Breastfeeding? No   BMI 38.36 kg/m²      Physical Exam   Constitutional: She is oriented to person, place, and time. Vital signs are normal. She appears well-developed and well-nourished.   HENT:   Head: Normocephalic and atraumatic.   Right Ear: Tympanic membrane and external ear normal.   Left Ear: Tympanic membrane and external ear normal.   Nose: Mucosal edema present.   Mouth/Throat: Posterior oropharyngeal erythema (mild) present. No oropharyngeal exudate or posterior oropharyngeal edema.   Eyes: EOM are normal. Pupils are equal, round, and reactive to light.   Neck: Normal range of motion.   Cardiovascular: Normal rate and regular rhythm.    Pulmonary/Chest: Effort normal.   Musculoskeletal: Normal range of motion.   Lymphadenopathy:     She has no cervical adenopathy.   Neurological: She is alert and oriented to person, place, and time.   Skin: Skin is warm and dry. Capillary refill takes less than 2 seconds.   Psychiatric: She has a normal mood and affect. Her speech is normal and behavior is normal. Thought content normal.   Vitals reviewed.              Assessment/Plan:     1. Sore throat  - POCT Rapid Strep A NEGATIVE    2. Viral pharyngitis    Discussed with patient pharyngitis may be due to virus or allergies at this time. She certainly could still develop strep in the next few days  Good hand washing  Warm salt water gargles  Throat lozenges  Increase water intake  Daily Zyrtec for 2 weeks  RTC in 2 days if no improvement or sooner for worsening condition  Supportive care, differential diagnoses, and indications for immediate follow-up discussed with patient.    Pathogenesis of diagnosis discussed including typical length and natural progression.      Instructed to return to  or nearest emergency department if symptoms fail to improve, for any change in condition, further concerns, or new concerning " symptoms.  Patient states understanding of the plan of care and discharge instructions.

## 2018-05-02 ENCOUNTER — HOSPITAL ENCOUNTER (OUTPATIENT)
Dept: RADIOLOGY | Facility: MEDICAL CENTER | Age: 33
End: 2018-05-02
Attending: UROLOGY
Payer: COMMERCIAL

## 2018-05-02 DIAGNOSIS — N20.1 CALCULUS OF URETER: ICD-10-CM

## 2018-05-02 PROCEDURE — 74018 RADEX ABDOMEN 1 VIEW: CPT

## 2018-05-07 ENCOUNTER — HOSPITAL ENCOUNTER (OUTPATIENT)
Facility: MEDICAL CENTER | Age: 33
End: 2018-05-07
Attending: UROLOGY | Admitting: UROLOGY
Payer: COMMERCIAL

## 2018-05-07 ENCOUNTER — APPOINTMENT (OUTPATIENT)
Dept: RADIOLOGY | Facility: MEDICAL CENTER | Age: 33
End: 2018-05-07
Attending: UROLOGY
Payer: COMMERCIAL

## 2018-05-07 VITALS
OXYGEN SATURATION: 96 % | DIASTOLIC BLOOD PRESSURE: 63 MMHG | HEIGHT: 61 IN | BODY MASS INDEX: 37.88 KG/M2 | TEMPERATURE: 97.7 F | RESPIRATION RATE: 16 BRPM | WEIGHT: 200.62 LBS | SYSTOLIC BLOOD PRESSURE: 109 MMHG

## 2018-05-07 LAB
B-HCG FREE SERPL-ACNC: <5 MIU/ML
IHCGL IHCGL: NEGATIVE MIU/ML
PATHOLOGY CONSULT NOTE: NORMAL

## 2018-05-07 PROCEDURE — 500880 HCHG PACK, CYSTO W/SEP LEGGINGS: Performed by: UROLOGY

## 2018-05-07 PROCEDURE — C1758 CATHETER, URETERAL: HCPCS | Performed by: UROLOGY

## 2018-05-07 PROCEDURE — 160028 HCHG SURGERY MINUTES - 1ST 30 MINS LEVEL 3: Performed by: UROLOGY

## 2018-05-07 PROCEDURE — 160046 HCHG PACU - 1ST 60 MINS PHASE II: Performed by: UROLOGY

## 2018-05-07 PROCEDURE — 160035 HCHG PACU - 1ST 60 MINS PHASE I: Performed by: UROLOGY

## 2018-05-07 PROCEDURE — 700102 HCHG RX REV CODE 250 W/ 637 OVERRIDE(OP): Performed by: UROLOGY

## 2018-05-07 PROCEDURE — 160039 HCHG SURGERY MINUTES - EA ADDL 1 MIN LEVEL 3: Performed by: UROLOGY

## 2018-05-07 PROCEDURE — 700111 HCHG RX REV CODE 636 W/ 250 OVERRIDE (IP): Performed by: UROLOGY

## 2018-05-07 PROCEDURE — 501329 HCHG SET, CYSTO IRRIG Y TUR: Performed by: UROLOGY

## 2018-05-07 PROCEDURE — 700111 HCHG RX REV CODE 636 W/ 250 OVERRIDE (IP)

## 2018-05-07 PROCEDURE — C1769 GUIDE WIRE: HCPCS | Performed by: UROLOGY

## 2018-05-07 PROCEDURE — 160002 HCHG RECOVERY MINUTES (STAT): Performed by: UROLOGY

## 2018-05-07 PROCEDURE — 160009 HCHG ANES TIME/MIN: Performed by: UROLOGY

## 2018-05-07 PROCEDURE — 84702 CHORIONIC GONADOTROPIN TEST: CPT

## 2018-05-07 PROCEDURE — 700101 HCHG RX REV CODE 250

## 2018-05-07 PROCEDURE — A9270 NON-COVERED ITEM OR SERVICE: HCPCS | Performed by: UROLOGY

## 2018-05-07 PROCEDURE — 700105 HCHG RX REV CODE 258: Performed by: UROLOGY

## 2018-05-07 PROCEDURE — 160025 RECOVERY II MINUTES (STATS): Performed by: UROLOGY

## 2018-05-07 PROCEDURE — 82365 CALCULUS SPECTROSCOPY: CPT

## 2018-05-07 PROCEDURE — 160048 HCHG OR STATISTICAL LEVEL 1-5: Performed by: UROLOGY

## 2018-05-07 PROCEDURE — 88300 SURGICAL PATH GROSS: CPT

## 2018-05-07 RX ORDER — HYDROMORPHONE HYDROCHLORIDE 2 MG/ML
0.5 INJECTION, SOLUTION INTRAMUSCULAR; INTRAVENOUS; SUBCUTANEOUS
Status: DISCONTINUED | OUTPATIENT
Start: 2018-05-07 | End: 2018-05-07 | Stop reason: HOSPADM

## 2018-05-07 RX ORDER — SODIUM CHLORIDE, SODIUM LACTATE, POTASSIUM CHLORIDE, CALCIUM CHLORIDE 600; 310; 30; 20 MG/100ML; MG/100ML; MG/100ML; MG/100ML
INJECTION, SOLUTION INTRAVENOUS CONTINUOUS
Status: DISCONTINUED | OUTPATIENT
Start: 2018-05-07 | End: 2018-05-07 | Stop reason: HOSPADM

## 2018-05-07 RX ORDER — OXYCODONE HYDROCHLORIDE 5 MG/1
5 TABLET ORAL
Status: DISCONTINUED | OUTPATIENT
Start: 2018-05-07 | End: 2018-05-07 | Stop reason: HOSPADM

## 2018-05-07 RX ORDER — SODIUM CHLORIDE, SODIUM LACTATE, POTASSIUM CHLORIDE, CALCIUM CHLORIDE 600; 310; 30; 20 MG/100ML; MG/100ML; MG/100ML; MG/100ML
1000 INJECTION, SOLUTION INTRAVENOUS
Status: COMPLETED | OUTPATIENT
Start: 2018-05-07 | End: 2018-05-07

## 2018-05-07 RX ORDER — ACETAMINOPHEN 325 MG/1
650 TABLET ORAL EVERY 6 HOURS
Status: DISCONTINUED | OUTPATIENT
Start: 2018-05-07 | End: 2018-05-07 | Stop reason: HOSPADM

## 2018-05-07 RX ORDER — OXYCODONE HYDROCHLORIDE 10 MG/1
10 TABLET ORAL
Status: DISCONTINUED | OUTPATIENT
Start: 2018-05-07 | End: 2018-05-07 | Stop reason: HOSPADM

## 2018-05-07 RX ADMIN — SODIUM CHLORIDE, SODIUM LACTATE, POTASSIUM CHLORIDE, CALCIUM CHLORIDE 1000 ML: 600; 310; 30; 20 INJECTION, SOLUTION INTRAVENOUS at 12:55

## 2018-05-07 ASSESSMENT — PAIN SCALES - GENERAL
PAINLEVEL_OUTOF10: 0

## 2018-05-07 NOTE — OR NURSING
1414- Pt to pacu via gurney with side rails up.  VSS,  Pt arouses to voice. Denies pain/nausea.  1433- updated via phone. Will not be able to be back until 4pm.   1451- VSS. Pt awake, alert.  States need to void, trying to wait to get up to bathroom, wants to avoid using the bedpan.  1453- Report to Cherie SHRESTHA, Pt meets stage 2 criteria.

## 2018-05-07 NOTE — DISCHARGE INSTRUCTIONS
ACTIVITY: Rest and take it easy for the first 24 hours.  A responsible adult is recommended to remain with you during that time.  It is normal to feel sleepy.  We encourage you to not do anything that requires balance, judgment or coordination.    MILD FLU-LIKE SYMPTOMS ARE NORMAL. YOU MAY EXPERIENCE GENERALIZED MUSCLE ACHES, THROAT IRRITATION, HEADACHE AND/OR SOME NAUSEA.    FOR 24 HOURS DO NOT:  Drive, operate machinery or run household appliances.  Drink beer or alcoholic beverages.   Make important decisions or sign legal documents.    SPECIAL INSTRUCTIONS: None    DIET: To avoid nausea, slowly advance diet as tolerated, avoiding spicy or greasy foods for the first day.  Add more substantial food to your diet according to your physician's instructions. INCREASE FLUIDS AND FIBER TO AVOID CONSTIPATION.    SURGICAL DRESSING/BATHING: may resume    FOLLOW-UP APPOINTMENT:  A follow-up appointment should be arranged with your doctor; call to schedule.    You should CALL YOUR PHYSICIAN if you develop:  Fever greater than 101 degrees F.  Pain not relieved by medication, or persistent nausea or vomiting.  Excessive bleeding (blood soaking through dressing) or unexpected drainage from the wound.  Extreme redness or swelling around the incision site, drainage of pus or foul smelling drainage.  Inability to urinate or empty your bladder within 8 hours.  Problems with breathing or chest pain.    You should call 911 if you develop problems with breathing or chest pain.  If you are unable to contact your doctor or surgical center, you should go to the nearest emergency room or urgent care center.  Physician's telephone #: [phone] Dr [address]    If any questions arise, call your doctor.  If your doctor is not available, please feel free to call the Surgical Center at [phone].  The Center is open Monday through Friday from 7AM to 7PM.  You can also call the [facility] HOTLINE open 24 hours/day, 7 days/week and speak to a  nurse at [phone], or toll free at [phone].    A registered nurse may call you a few days after your surgery to see how you are doing after your procedure.    MEDICATIONS: Resume taking daily medication.  Take prescribed pain medication with food.  If no medication is prescribed, you may take non-aspirin pain medication if needed.  PAIN MEDICATION CAN BE VERY CONSTIPATING.  Take a stool softener or laxative such as senokot, pericolace, or milk of magnesia if needed.    Prescription given for N/A.  Last pain medication given at N/A.    If your physician has prescribed pain medication that includes Acetaminophen (Tylenol), do not take additional Acetaminophen (Tylenol) while taking the prescribed medication.    Depression / Suicide Risk    As you are discharged from this Critical access hospital facility, it is important to learn how to keep safe from harming yourself.    Recognize the warning signs:  · Abrupt changes in personality, positive or negative- including increase in energy   · Giving away possessions  · Change in eating patterns- significant weight changes-  positive or negative  · Change in sleeping patterns- unable to sleep or sleeping all the time   · Unwillingness or inability to communicate  · Depression  · Unusual sadness, discouragement and loneliness  · Talk of wanting to die  · Neglect of personal appearance   · Rebelliousness- reckless behavior  · Withdrawal from people/activities they love  · Confusion- inability to concentrate     If you or a loved one observes any of these behaviors or has concerns about self-harm, here's what you can do:  · Talk about it- your feelings and reasons for harming yourself  · Remove any means that you might use to hurt yourself (examples: pills, rope, extension cords, firearm)  · Get professional help from the community (Mental Health, Substance Abuse, psychological counseling)  · Do not be alone:Call your Safe Contact- someone whom you trust who will be there  for you.  · Call your local CRISIS HOTLINE 214-5773 or 158-802-3578  · Call your local Children's Mobile Crisis Response Team [place] (793) 308-2442 or www.[employer]  · Call the toll free National Suicide Prevention Hotlines   · National Suicide Prevention Lifeline 789-013-WUHH (2317)  · National [employer] Line Network 800-SUICIDE (041-8040)

## 2018-05-07 NOTE — OR NURSING
1505: Settled in recliner post short ambulation from bathroom. Po fluids encouraged. Pt's ride anticipated approx 1600. Pt states some burning on urination, understands this is expected post this procedure.  1515: S/b surgeon who also tells pt some burning with urination is expected and will decrease with each void.  1554: D/Josue to care of family post uneventful stay in PACU 2.

## 2018-05-07 NOTE — OR SURGEON
Immediate Post OP Note    PreOp Diagnosis: 4 mm right distal ureteral calculus    PostOp Diagnosis: same    Procedure(s):  CYSTOSCOPY - Wound Class: Contaminated  URETEROSCOPY;  BASKET EXTRACTION OF RIGHT URETERAL CALCULUS - Wound Class: Contaminated    Surgeon(s):  Osmel Seo M.D.    Anesthesiologist/Type of Anesthesia:  Anesthesiologist: Glory Tomlinson M.D./General    Surgical Staff:  Circulator: Sandy Salinas R.N.  Scrub Person: Roselia Bauman    Specimens removed if any:  * No specimens in log *    Estimated Blood Loss: none    Findings: 4 mm distal right ureteral calculus    Complications: none.  Stable to PACU        5/7/2018 2:13 PM Osmel Seo M.D.

## 2018-05-07 NOTE — OP REPORT
DATE OF SERVICE:  05/07/2018    PREOPERATIVE DIAGNOSIS:  4 mm distal right ureteral calculus.    POSTOPERATIVE DIAGNOSIS:  4 mm distal right ureteral calculus.    PROCEDURE:  Cystoscopy with right rigid ureteroscopy and basket extraction of   right ureteral calculus.    ANESTHESIA:  General.    ANESTHESIOLOGIST:  Glory Tomlinson MD    SURGEON:  Osmel Seo MD    BRIEF HISTORY:  This 32-year-old female presented with right renal colic.  On   imaging, she was found to have a 4 mm mid ureteral calculus.  She presented   back last week for preop appointment and a KUB did show the calculus had moved   down into the lower ureter.  She now presents for cystoscopy, ureteroscopy,   lasertripsy of the calculus, possible stent insertion.    REPORT OF OPERATION:  Under general anesthesia with the patient in lithotomy   position, the genitalia were prepped and draped in a sterile manner.  A   21-Gibraltarian cystoscope was introduced into the bladder.  Urethra and bladder   were unremarkable.  A zip wire was passed up to the right kidney under   fluoroscopic guidance.  The stone was identified along with the zip wire in   the pelvis.  At this point, the semirigid ureteroscope was then passed over   the wire up into the lower ureter.  The stone was identified.  The wire was   removed and at this point, a 0-tip basket was then passed through the scope   and the stone was engaged in that.  I was unable to extract it fairly easily.    This was sent for chemical analysis.  At this point, the ureteroscope was   reintroduced back into the ureter.  There was some minor oozing of blood in   the area of the intramural tunnel.  I passed the scope in the left, the   ureteroscope sit for approximately 4-5 minutes to help tamponade this.  Upon   removal, it had slowed down quite a bit.  The cystoscope was reintroduced into   the bladder.  An examination did show fairly strong jets of urine coming from   the right ureteral orifice.  At  this point, the bladder was drained, the   cystoscope removed, and the patient cleaned up, woken up, and transferred to   the PACU in stable condition.       ____________________________________     MD RORY VARGAS / PALLAVI    DD:  05/07/2018 14:17:41  DT:  05/07/2018 14:35:24    D#:  [id number]  Job#:  [id number]

## 2018-05-12 LAB
APPEARANCE STONE: NORMAL
COMPN STONE: NORMAL
NUMBER STONE: 1
SIZE STONE: NORMAL MM
SPECIMEN WT: 42 MG

## 2018-07-10 ENCOUNTER — GYNECOLOGY VISIT (OUTPATIENT)
Dept: OBGYN | Facility: MEDICAL CENTER | Age: 33
End: 2018-07-10
Payer: COMMERCIAL

## 2018-07-10 ENCOUNTER — HOSPITAL ENCOUNTER (OUTPATIENT)
Facility: MEDICAL CENTER | Age: 33
End: 2018-07-10
Attending: OBSTETRICS & GYNECOLOGY
Payer: COMMERCIAL

## 2018-07-10 VITALS
HEIGHT: 61 IN | DIASTOLIC BLOOD PRESSURE: 80 MMHG | WEIGHT: 201 LBS | SYSTOLIC BLOOD PRESSURE: 120 MMHG | BODY MASS INDEX: 37.95 KG/M2

## 2018-07-10 DIAGNOSIS — Z12.4 SCREENING FOR MALIGNANT NEOPLASM OF CERVIX: ICD-10-CM

## 2018-07-10 DIAGNOSIS — Z11.51 SPECIAL SCREENING EXAMINATION FOR HUMAN PAPILLOMAVIRUS (HPV): ICD-10-CM

## 2018-07-10 DIAGNOSIS — Z01.419 WELL WOMAN EXAM: ICD-10-CM

## 2018-07-10 PROCEDURE — 88175 CYTOPATH C/V AUTO FLUID REDO: CPT

## 2018-07-10 PROCEDURE — 87624 HPV HI-RISK TYP POOLED RSLT: CPT

## 2018-07-10 PROCEDURE — 99395 PREV VISIT EST AGE 18-39: CPT | Performed by: OBSTETRICS & GYNECOLOGY

## 2018-07-10 NOTE — PROGRESS NOTES
ANNUAL Gynecologic Exam        [facility] Comments:   32 YEAR OLD  presents for well woman exam.  Patient's last menstrual period was 06/18/2018.  Regular periods, no dysmenorrhea  (+) brownish vaginal discharge, she is mid-cycle  No pelvic pains, no dyspareunia  Never smoker  No family history of breast/ovarian cancer    Review of Systems   Pertinent positives documented in HPI and all other systems reviewed & are negative    All PMH, PSH, allergies, social history and FH reviewed and updated today:  Past Medical History:   Diagnosis Date   • Known health problems: none    • Microcytic anemia 6/29/2017   • Subclinical hypothyroidism 6/29/2017   • Urinary incontinence      Past Surgical History:   Procedure Laterality Date   • CYSTOSCOPY STENT PLACEMENT Right 5/7/2018    Procedure: CYSTOSCOPY;  Surgeon: Osmel Seo M.D.;  Location: SURGERY [facility];  Service: Urology   • URETEROSCOPY Right 5/7/2018    Procedure: URETEROSCOPY;  Surgeon: Osmel Seo M.D.;  Location: SURGERY [facility];  Service: Urology   • DILATION AND CURETTAGE      2011, missed carriage   • HERNIA REPAIR      9 yrs old, umbilical hernia     Penicillins  Social History     Social History   • Marital status:      Spouse name: N/A   • Number of children: N/A   • Years of education: N/A     Social History Main Topics   • Smoking status: Never Smoker   • Smokeless tobacco: Never Used   • Alcohol use 0.0 oz/week      Comment: 1 per month   • Drug use: No   • Sexual activity: Yes     Partners: Male     Birth control/ protection: Condom     Other Topics Concern   • Not on file     Social History Narrative   • No narrative on file     Family History   Problem Relation Age of Onset   • Diabetes Maternal Uncle    • Diabetes Maternal Grandmother    • No Known Problems Mother    • No Known Problems Sister    • Heart Disease Maternal Grandfather    • No Known Problems Paternal Grandmother    • No Known Problems Paternal  "Grandfather      Medications:   Current Outpatient Prescriptions Ordered in [facility]   Medication Sig Dispense Refill   • Probiotic Product (PROBIOTIC DAILY PO) Take 1 Tab by mouth 3 times a day.     • NON SPECIFIED Take 1 Tab by mouth every day. Blood Builder (supplement to increase iron levels)     • ketorolac (TORADOL) 10 MG Tab Take 10 mg by mouth every 6 hours as needed.     • potassium citrate SR (UROCIT-K SR) 10 MEQ (1080 MG) Tab CR Take 10 mEq by mouth 2 Times a Day.     • ondansetron (ZOFRAN ODT) 4 MG TABLET DISPERSIBLE Take 4 mg by mouth every 8 hours as needed for Nausea.       No current [facility]-ordered facility-administered medications on file.       Objective:   Vital measurements:  Blood pressure 120/80, height 1.549 m (5' 1\"), weight 91.2 kg (201 lb), last menstrual period 06/18/2018.  Body mass index is 37.98 kg/m². (Goal BM I>18 <25)    Physical Exam   Nursing note and vitals reviewed.  Constitutional: She is oriented to person, place, and time. She appears well-developed and well-nourished. No distress.     HEENT:   Head: Normocephalic and atraumatic.   Right Ear: External ear normal.   Left Ear: External ear normal.   Nose: Nose normal.   Eyes: Conjunctivae and EOM are normal. Pupils are equal, round, and reactive to light. No scleral icterus.     Neck: Normal range of motion. Neck supple. No tracheal deviation present. No thyromegaly present.     Pulmonary/Chest: Effort normal and breath sounds normal. No respiratory distress. She has no wheezes. She has no rales. She exhibits no tenderness.     Cardiovascular: Regular, rate and rhythm. No JVD.    Abdominal: Soft. Bowel sounds are normal. She exhibits no distension and no mass. No tenderness. She has no rebound and no guarding.     Breast:  Symmetrical, normal consistency without masses., No dimpling or skin changes, Normal nipples without discharge, negative, No masses    Genitourinary:  Pelvic exam was performed with patient supine.  External " genitalia with no abnormal pigmentation, labial fusion,rash, tenderness, lesion or injury to the labia bilaterally.  Vagina is moist with no lesions, foul discharge, erythema, tenderness or bleeding. No foreign body around the vagina or signs of injury.   Cervix exhibits no motion tenderness, no discharge and no friability.   Uterus is not deviated, not enlarged, not fixed and not tender.  Adnexa - non palpable because of body habitus    Musculoskeletal: Normal range of motion. She exhibits no edema and no tenderness.     Lymphadenopathy: She has no cervical adenopathy.     Neurological: She is alert and oriented to person, place, and time. She exhibits normal muscle tone.     Skin: Skin is warm and dry. No rash noted. She is not diaphoretic. No erythema. No pallor.     Psychiatric: She has a normal mood and affect. Her behavior is normal. Judgment and thought content normal  Assessment:     1. Well woman exam     2. Screening for malignant neoplasm of cervix  THINPREP PAP WITH HPV   3. Special screening examination for human papillomavirus (HPV)  THINPREP PAP WITH HPV     Plan:   Pap and physical exam performed  Monthly SBE. Self breast awareness education   Counseling: breast self exam, STD prevention, HIV risk factors and prevention and family planning choices  Encourage exercise and proper diet. Weight loss  Mammograms starting @ age 40 annually.  See medications and orders placed in encounter report.

## 2018-07-11 DIAGNOSIS — Z12.4 SCREENING FOR MALIGNANT NEOPLASM OF CERVIX: ICD-10-CM

## 2018-07-11 DIAGNOSIS — Z11.51 SPECIAL SCREENING EXAMINATION FOR HUMAN PAPILLOMAVIRUS (HPV): ICD-10-CM

## 2019-02-06 ENCOUNTER — OFFICE VISIT (OUTPATIENT)
Dept: MEDICAL GROUP | Age: 34
End: 2019-02-06
Payer: COMMERCIAL

## 2019-02-06 ENCOUNTER — HOSPITAL ENCOUNTER (OUTPATIENT)
Dept: LAB | Facility: MEDICAL CENTER | Age: 34
End: 2019-02-06
Attending: INTERNAL MEDICINE
Payer: COMMERCIAL

## 2019-02-06 VITALS
HEART RATE: 88 BPM | HEIGHT: 61 IN | TEMPERATURE: 97.5 F | BODY MASS INDEX: 37.38 KG/M2 | WEIGHT: 198 LBS | DIASTOLIC BLOOD PRESSURE: 88 MMHG | SYSTOLIC BLOOD PRESSURE: 128 MMHG | OXYGEN SATURATION: 96 %

## 2019-02-06 DIAGNOSIS — N91.2 AMENORRHEA: ICD-10-CM

## 2019-02-06 DIAGNOSIS — J02.9 ACUTE PHARYNGITIS, UNSPECIFIED ETIOLOGY: ICD-10-CM

## 2019-02-06 LAB — HCG SERPL QL: NEGATIVE

## 2019-02-06 PROCEDURE — 84703 CHORIONIC GONADOTROPIN ASSAY: CPT

## 2019-02-06 PROCEDURE — 99213 OFFICE O/P EST LOW 20 MIN: CPT | Performed by: INTERNAL MEDICINE

## 2019-02-06 PROCEDURE — 36415 COLL VENOUS BLD VENIPUNCTURE: CPT

## 2019-02-06 RX ORDER — AZITHROMYCIN 250 MG/1
TABLET, FILM COATED ORAL
Qty: 6 TAB | Refills: 1 | Status: SHIPPED | OUTPATIENT
Start: 2019-02-06 | End: 2020-02-21

## 2019-02-06 ASSESSMENT — ENCOUNTER SYMPTOMS
SORE THROAT: 1
CONSTITUTIONAL NEGATIVE: 1
NEUROLOGICAL NEGATIVE: 1
CARDIOVASCULAR NEGATIVE: 1
GASTROINTESTINAL NEGATIVE: 1
MUSCULOSKELETAL NEGATIVE: 1
EYES NEGATIVE: 1
PSYCHIATRIC NEGATIVE: 1
RESPIRATORY NEGATIVE: 1

## 2019-02-06 ASSESSMENT — PATIENT HEALTH QUESTIONNAIRE - PHQ9: CLINICAL INTERPRETATION OF PHQ2 SCORE: 0

## 2019-02-06 NOTE — PROGRESS NOTES
Subjective:   This is a new patient me unable see PCP today.  Amy Correa is a 33 y.o. female who presents with a sore throat.      HPI  She is a patient of Dr. Azul (PCP). She developed a constant sore throat yesterday which is associated with nasal congestion, rhinorrhea and bilateral ear pain. She denies taking any medications for her symptoms. Negative for fevers, chills, dysphagia, trismus, drooling, cough or shortness of breath. She reports ill contact with her son who has streptococcus.     In addition, she is requesting a lab test to verify a possible pregnancy. Her last menstrual cycle was on 12/29/18. She did not have a menstrual cycle in January and states she took a home pregnancy test yesterday which was negative. She did modify her diet recently by eliminating gluten but otherwise reports no changes.       Patient Active Problem List   Diagnosis   • Obesity (BMI 30-39.9)   • Microcytic anemia   • Subclinical hypothyroidism   • Obesity (BMI 35.0-39.9 without comorbidity) ([facility])   • Acute cystitis without hematuria   • Acute right-sided low back pain without sciatica   • Right ureteral stone- dr kc   • Hydronephrosis with urinary obstruction due to ureteral calculus       Outpatient Medications Prior to Visit   Medication Sig Dispense Refill   • NON SPECIFIED Take 1 Tab by mouth every day. Blood Builder (supplement to increase iron levels)     • ketorolac (TORADOL) 10 MG Tab Take 10 mg by mouth every 6 hours as needed.     • potassium citrate SR (UROCIT-K SR) 10 MEQ (1080 MG) Tab CR Take 10 mEq by mouth 2 Times a Day.     • ondansetron (ZOFRAN ODT) 4 MG TABLET DISPERSIBLE Take 4 mg by mouth every 8 hours as needed for Nausea.     • Probiotic Product (PROBIOTIC DAILY PO) Take 1 Tab by mouth 3 times a day.       No facility-administered medications prior to visit.         Allergies   Allergen Reactions   • Penicillins Rash       Review of Systems   Constitutional: Negative.    HENT: Positive  "for congestion (nasal), ear pain (bilateral) and sore throat.         Positive for rhinorrhea. Negative for dysphagia, trismus or drooling.   Eyes: Negative.    Respiratory: Negative.    Cardiovascular: Negative.    Gastrointestinal: Negative.    Genitourinary: Negative.    Musculoskeletal: Negative.    Skin: Negative.    Neurological: Negative.    Endo/Heme/Allergies: Negative.    Psychiatric/Behavioral: Negative.    All other systems reviewed and are negative.    See HPI for further details.       Objective:     /88 (BP Location: Left arm, Patient Position: Sitting)   Pulse 88   Temp 36.4 °C (97.5 °F) (Temporal)   Ht 1.549 m (5' 1\")   Wt 89.8 kg (198 lb)   SpO2 96%   BMI 37.41 kg/m²     Physical Exam   Constitutional: Oriented to person, place, and time. Appears well-developed and well-nourished. No distress.   Head: Normocephalic and atraumatic.   Right Ear: External ear normal.   Left Ear: External ear normal.   Nose: Nose normal.   Mouth/Throat: Oropharynx is erythematous, uvula is edematous, no oropharyngeal exudate.   Eyes: Pupils are equal, round, and reactive to light. Conjunctivae and EOM are normal. Right eye exhibits no discharge. Left eye exhibits no discharge. No scleral icterus.   Neck: Normal range of motion. Neck supple. No JVD present. No tracheal deviation present. No thyromegaly present. No bruit.  Cardiovascular: Normal rate, regular rhythm, normal heart sounds and intact distal pulses.  Exam reveals no gallop and no friction rub.  No murmur heard.  Pulmonary/Chest: Effort normal. No stridor. No respiratory distress. No wheezing or rales. No tenderness.   Musculoskeletal: Normal range of motion.   Lymphadenopathy: No cervical adenopathy.   Neurological: Alert and oriented to person, place, and time. Normal reflexes. Normal reflexes. No cranial nerve deficit. Normal muscle tone. Coordination normal.   Skin: Skin is warm and dry. No rash noted. Not diaphoretic. No erythema. No pallor. "   Psychiatric: Normal mood and affect. Behavior is normal. Judgment and thought content normal.     Nursing note and vitals reviewed.       No visits with results within 1 Month(s) from this visit.   Latest known visit with results is:   Hospital Outpatient Visit on 07/10/2018   Component Date Value   • Cytology Reg 07/10/2018 See Path Report    • Source 07/10/2018 Cervical    • HPV Genotype 16 07/10/2018 Negative    • HPV Genotype 18 07/10/2018 Negative    • HPV Other High Risk Erin* 07/10/2018 Negative       Lab Results   Component Value Date/Time    HBA1C 5.0 06/28/2017 07:37 AM     Lab Results   Component Value Date/Time    SODIUM 140 04/18/2018 03:15 PM    POTASSIUM 3.6 04/18/2018 03:15 PM    CHLORIDE 106 04/18/2018 03:15 PM    CO2 27 04/18/2018 03:15 PM    GLUCOSE 101 (H) 04/18/2018 03:15 PM    BUN 17 04/18/2018 03:15 PM    CREATININE 0.59 04/18/2018 03:15 PM    ALKPHOSPHAT 61 04/18/2018 03:15 PM    ASTSGOT 27 04/18/2018 03:15 PM    ALTSGPT 13 04/18/2018 03:15 PM    TBILIRUBIN 0.4 04/18/2018 03:15 PM     No results found for: INR  Lab Results   Component Value Date/Time    CHOLSTRLTOT 119 06/28/2017 07:37 AM    LDL 68 06/28/2017 07:37 AM    HDL 41 06/28/2017 07:37 AM    TRIGLYCERIDE 52 06/28/2017 07:37 AM       No results found for: TESTOSTERONE  No results found for: TSH  Lab Results   Component Value Date/Time    FREET4 0.87 06/28/2017 07:37 AM     Lab Results   Component Value Date/Time    URICACID 2.8 06/27/2014 02:30 PM     No components found for: VITB12  Lab Results   Component Value Date/Time    25HYDROXY 30 06/28/2017 07:37 AM          Assessment/Plan:     1. Acute pharyngitis, unspecified etiology  Negative rapid strep.  If symptoms do not improve in 48 hours, she was encouraged to fill the prescription for Azithromycin. Advised she take Claritin or Zyrtec for congestion.  - azithromycin (ZITHROMAX) 250 MG Tab; Take 2 tabs today then 1 per day for 4 days  Dispense: 6 Tab; Refill: 1    2.  Amenorrhea  Plan to evaluate for a possible pregnancy with laboratory testing.  - HCG QUAL SERUM; Future      --Follow up as needed.        minute face-to-face encounter took place today.  More than half of this time was spent in the coordination of care of the above problems, as well as counseling.      IPat (Scribe), am scribing for, and in the presence of, Eliseo Ambrosio M.D.    Electronically signed by: Pat Miramontes (Scribe), 2/6/2019    IEliseo M.D., personally performed the services described in this documentation, as scribed by Pat Miramontes in my presence, and it is both accurate and complete.

## 2019-11-30 ENCOUNTER — OFFICE VISIT (OUTPATIENT)
Dept: URGENT CARE | Facility: MEDICAL CENTER | Age: 34
End: 2019-11-30
Payer: COMMERCIAL

## 2019-11-30 ENCOUNTER — HOSPITAL ENCOUNTER (OUTPATIENT)
Dept: RADIOLOGY | Facility: MEDICAL CENTER | Age: 34
End: 2019-11-30
Attending: NURSE PRACTITIONER
Payer: COMMERCIAL

## 2019-11-30 VITALS
TEMPERATURE: 97.7 F | DIASTOLIC BLOOD PRESSURE: 82 MMHG | BODY MASS INDEX: 39.89 KG/M2 | WEIGHT: 211.3 LBS | HEART RATE: 74 BPM | SYSTOLIC BLOOD PRESSURE: 120 MMHG | OXYGEN SATURATION: 98 % | RESPIRATION RATE: 16 BRPM | HEIGHT: 61 IN
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
## 2019-11-30 DIAGNOSIS — S69.91XA HAND INJURY, RIGHT, INITIAL ENCOUNTER: ICD-10-CM

## 2019-11-30 DIAGNOSIS — S62.304A CLOSED NONDISPLACED FRACTURE OF FOURTH METACARPAL BONE OF RIGHT HAND, UNSPECIFIED PORTION OF METACARPAL, INITIAL ENCOUNTER: Primary | ICD-10-CM

## 2019-11-30 PROCEDURE — 73130 X-RAY EXAM OF HAND: CPT | Mod: RT

## 2019-11-30 PROCEDURE — 29075 APPL CST ELBW FNGR SHORT ARM: CPT | Performed by: NURSE PRACTITIONER

## 2019-11-30 PROCEDURE — 99214 OFFICE O/P EST MOD 30 MIN: CPT | Mod: 25 | Performed by: NURSE PRACTITIONER

## 2019-11-30 ASSESSMENT — ENCOUNTER SYMPTOMS
NUMBNESS: 0
TINGLING: 1
MUSCLE WEAKNESS: 0

## 2019-11-30 NOTE — PROGRESS NOTES
"Subjective:      Amy Correa is a 34 y.o. female who presents with Wrist Injury (Right wrist and finger injury from fall)  \  Reviewed past medical, surgical and family history. Reviewed prescription and OTC medications with patient in electronic health record today      Allergies   Allergen Reactions   • Penicillins Rash               Wrist Injury    The incident occurred 1 to 3 hours ago. The incident occurred at home. The injury mechanism was a fall. The pain is present in the right hand and right wrist. The quality of the pain is described as aching. The pain does not radiate. The pain is at a severity of 8/10. The pain is severe. The pain has been constant since the incident. Associated symptoms include tingling. Pertinent negatives include no chest pain, muscle weakness or numbness. The symptoms are aggravated by movement and palpation. She has tried NSAIDs, immobilization and ice for the symptoms. The treatment provided mild relief.       Review of Systems   Cardiovascular: Negative for chest pain.   Neurological: Positive for tingling. Negative for numbness.          Objective:     /82 (BP Location: Left arm, Patient Position: Sitting, BP Cuff Size: Large adult)   Pulse 74   Temp 36.5 °C (97.7 °F) (Temporal)   Resp 16   Ht 1.549 m (5' 1\")   Wt 95.8 kg (211 lb 4.8 oz)   SpO2 98%   BMI 39.92 kg/m²      Physical Exam  Vitals signs and nursing note reviewed.   Constitutional:       General: She is not in acute distress.     Appearance: She is well-developed. She is not toxic-appearing.   HENT:      Head: Normocephalic.   Cardiovascular:      Rate and Rhythm: Normal rate and regular rhythm.      Pulses: Normal pulses.   Pulmonary:      Effort: Pulmonary effort is normal.      Breath sounds: Normal breath sounds.   Musculoskeletal:      Right hand: She exhibits decreased range of motion, tenderness, bony tenderness and swelling. She exhibits normal two-point discrimination, normal capillary " "refill, no deformity and no laceration. Normal sensation noted. Decreased strength noted. She exhibits thumb/finger opposition. She exhibits no finger abduction and no wrist extension trouble.        Hands:    Skin:     General: Skin is warm and dry.      Capillary Refill: Capillary refill takes less than 2 seconds.   Neurological:      Mental Status: She is alert and oriented to person, place, and time.   Psychiatric:         Attention and Perception: Attention and perception normal.         Mood and Affect: Mood normal.         Behavior: Behavior normal. Behavior is cooperative.            Xray: No acute fracture or dislocation of fingers. + acute fracture 4th metacarpal by my read.  Radiologist impression:      11/30/2019 12:09 PM     HISTORY/REASON FOR EXAM: Right hand pain.        TECHNIQUE/EXAM DESCRIPTION AND NUMBER OF VIEWS:  3 views of the RIGHT hand.     COMPARISON:     FINDINGS: Bone mineralization is normal. There is an obliquely oriented fracture involving the fourth metacarpal diaphysis. Soft tissues are normal.     IMPRESSION:     Fracture the fourth middle carpal diaphysis.       Orthoglass splint applied in UC. Boxer Splint. Good CSN distally .          Assessment/Plan:     1. Closed nondisplaced fracture of fourth metacarpal bone of right hand, unspecified portion of metacarpal, initial encounter  REFERRAL TO SPORTS MEDICINE   2. Hand injury, right, initial encounter  DX-HAND 3+ RIGHT   3. Fall on same level from slipping, tripping or stumbling, initial encounter  DX-HAND 3+ RIGHT       \"P.R.I.C.E.\" discussed with pt (protection from further injury, rest, ice, compession and elevation).     OTC  analgesic of choice. Follow manufactures dosing and safety precautions.     Advised not to drive with right arm cast. Verbalizes understanding. Safety risk      Schedule FU with sports medicine clinic     Return to clinic or PCP  7-10 days/ prn  if current symptoms are not resolving in a satisfactory manner " or sooner if new or worsening symptoms occur. Differential diagnosis, natural history, supportive care, and indications for immediate follow-up discussed at length.   Patient was advised of signs and symptoms which would warrant further evaluation and /or emergent evaluation in ER.  Verbalized agreement with this treatment plan and seemed to understand without barriers. Questions were encouraged and answered to patients satisfaction.

## 2019-11-30 NOTE — LETTER
November 30, 2019       Patient: Amy Correa   YOB: 1985   Date of Visit: 11/30/2019         To Whom It May Concern:    It is my medical opinion that Amy Correa return to full duty, no restrictions on 12/03/19.              Sincerely,          KAITY Ness  Electronically Signed

## 2019-12-03 ENCOUNTER — OFFICE VISIT (OUTPATIENT)
Dept: MEDICAL GROUP | Facility: CLINIC | Age: 34
End: 2019-12-03
Payer: COMMERCIAL

## 2019-12-03 ENCOUNTER — APPOINTMENT (OUTPATIENT)
Dept: RADIOLOGY | Facility: IMAGING CENTER | Age: 34
End: 2019-12-03
Attending: FAMILY MEDICINE
Payer: COMMERCIAL

## 2019-12-03 VITALS
HEIGHT: 61 IN | WEIGHT: 211.3 LBS | SYSTOLIC BLOOD PRESSURE: 118 MMHG | BODY MASS INDEX: 39.89 KG/M2 | OXYGEN SATURATION: 97 % | DIASTOLIC BLOOD PRESSURE: 78 MMHG | HEART RATE: 78 BPM | RESPIRATION RATE: 16 BRPM | TEMPERATURE: 98.6 F

## 2019-12-03 DIAGNOSIS — S62.324A DISPLACED FRACTURE OF SHAFT OF FOURTH METACARPAL BONE, RIGHT HAND, INITIAL ENCOUNTER FOR CLOSED FRACTURE: ICD-10-CM

## 2019-12-03 PROCEDURE — 99024 POSTOP FOLLOW-UP VISIT: CPT | Performed by: FAMILY MEDICINE

## 2019-12-03 PROCEDURE — 26605 TREAT METACARPAL FRACTURE: CPT | Performed by: FAMILY MEDICINE

## 2019-12-03 PROCEDURE — 73130 X-RAY EXAM OF HAND: CPT | Mod: TC,RT | Performed by: FAMILY MEDICINE

## 2019-12-03 ASSESSMENT — ENCOUNTER SYMPTOMS
SHORTNESS OF BREATH: 0
DIZZINESS: 0
CHILLS: 0
FEVER: 0
VOMITING: 0
NAUSEA: 0

## 2019-12-04 NOTE — PROGRESS NOTES
Subjective:      Amy Correa is a 34 y.o. female who presents with Hand Injury (Referral from UC/ R hand injury )       Referred by KAITY Ness for evaluation of RIGHT hand pain (right-hand-dominant)    HPI   RIGHT hand pain   Date of injury, Friday, November 29, 2018  Mechanism of injury, playing with her child and inadvertently tripped on a weight causing her to fall forward and ran into a metal cabinet that  her third and fourth fingers on the RIGHT hand causing sudden sharp pain predominantly at the fourth metacarpal region  Improved with rest and immobilization  Worse with movement of the hand  Pain is predominantly at the fourth metacarpal region, she did have some forearm discomfort from her splint  Denies any prior issues with the RIGHT hand  Advil for pain which helps some    Works at a school,     Review of Systems   Constitutional: Negative for chills and fever.   Respiratory: Negative for shortness of breath.    Cardiovascular: Negative for chest pain.   Gastrointestinal: Negative for nausea and vomiting.   Neurological: Negative for dizziness.     PMH:  has a past medical history of Known health problems: none, Microcytic anemia (6/29/2017), Subclinical hypothyroidism (6/29/2017), and Urinary incontinence.  MEDS:   Current Outpatient Medications:   •  azithromycin (ZITHROMAX) 250 MG Tab, Take 2 tabs today then 1 per day for 4 days (Patient not taking: Reported on 11/30/2019), Disp: 6 Tab, Rfl: 1  •  NON SPECIFIED, Take 1 Tab by mouth every day. Blood Builder (supplement to increase iron levels), Disp: , Rfl:   •  ketorolac (TORADOL) 10 MG Tab, Take 10 mg by mouth every 6 hours as needed., Disp: , Rfl:   •  potassium citrate SR (UROCIT-K SR) 10 MEQ (1080 MG) Tab CR, Take 10 mEq by mouth 2 Times a Day., Disp: , Rfl:   •  ondansetron (ZOFRAN ODT) 4 MG TABLET DISPERSIBLE, Take 4 mg by mouth every 8 hours as needed for Nausea., Disp: , Rfl:   •   "Probiotic Product (PROBIOTIC DAILY PO), Take 1 Tab by mouth 3 times a day., Disp: , Rfl:   ALLERGIES:   Allergies   Allergen Reactions   • Penicillins Rash     SURGHX:   Past Surgical History:   Procedure Laterality Date   • CYSTOSCOPY STENT PLACEMENT Right 5/7/2018    Procedure: CYSTOSCOPY;  Surgeon: Osmel Seo M.D.;  Location: SURGERY [facility];  Service: Urology   • URETEROSCOPY Right 5/7/2018    Procedure: URETEROSCOPY;  Surgeon: Osmel Seo M.D.;  Location: SURGERY [facility];  Service: Urology   • DILATION AND CURETTAGE      2011, missed carriage   • HERNIA REPAIR      9 yrs old, umbilical hernia     SOCHX:  reports that she has never smoked. She has never used smokeless tobacco. She reports current alcohol use. She reports that she does not use drugs.  FH: Family history was reviewed, no pertinent findings to report     Objective:     /78 (BP Location: Left arm, Patient Position: Sitting, BP Cuff Size: Adult)   Pulse 78   Temp 37 °C (98.6 °F) (Temporal)   Resp 16   Ht 1.549 m (5' 1\")   Wt 95.8 kg (211 lb 4.8 oz)   SpO2 97%   BMI 39.92 kg/m²      Physical Exam     Hand exam    NAD  Alert and oriented    BILATERAL WRIST exam  Range of motion intact  No tenderness along scaphoid, TFCC insertion, distal radius or distal ulna  Tinel's testing is NEGATIVE  The hand is otherwise neurovascularly intact    BILATERAL hand exam  POSITIVE RIGHT hand swelling   MILD knuckle depression at the RIGHT fourth metacarpal head     Range of motion DECREASED at the RIGHT fourth MCP joint  NO rotational deformity  Pinky opposition NORMAL  Grind test is NEGATIVE  Collateral ligament testing is NORMAL    Assessment/Plan:     1. Displaced fracture of shaft of fourth metacarpal bone, right hand, initial encounter for closed fracture       Fracture REDUCTION:  Traction was applied to the RIGHT fourth finger for better metacarpal head alignment    Fracture was stabilized and custom " removable ulnar gutter splint in the office TODAY (December 3, 2019)    The plan is to continue ulnar gutter splint for a total of 4 weeks (until on or after December 31, 2019)    Return in about 1 week (around 12/10/2019).  For fracture check    12/3/2019 5:18 PM     HISTORY/REASON FOR EXAM:  Follow-up 4th metacarpal fracture.        TECHNIQUE/EXAM DESCRIPTION AND NUMBER OF VIEWS:  3 views of the RIGHT hand.     COMPARISON: 11/30/2019     FINDINGS:     There is no focal soft tissue swelling.     There is an oblique mildly displaced fracture of the right proximal/mid 4th metacarpal which is unchanged in appearance. No significant bridging callus formation is identified during this very short time interval.     Remainder of the right hand is unremarkable.        IMPRESSION:     1.  No interval change in the mildly displaced oblique right 4th metacarpal fracture.        11/30/2019 12:09 PM     HISTORY/REASON FOR EXAM: Right hand pain.        TECHNIQUE/EXAM DESCRIPTION AND NUMBER OF VIEWS:  3 views of the RIGHT hand.     COMPARISON:     FINDINGS: Bone mineralization is normal. There is an obliquely oriented fracture involving the fourth metacarpal diaphysis. Soft tissues are normal.     IMPRESSION:     Fracture the fourth middle carpal diaphysis.    Interpreted in the office today with the patient    Thank you China Gardiner, A.P.N. for allowing me to participate in caring for your patient.

## 2019-12-10 ENCOUNTER — OFFICE VISIT (OUTPATIENT)
Dept: MEDICAL GROUP | Facility: CLINIC | Age: 34
End: 2019-12-10
Payer: COMMERCIAL

## 2019-12-10 ENCOUNTER — APPOINTMENT (OUTPATIENT)
Dept: RADIOLOGY | Facility: IMAGING CENTER | Age: 34
End: 2019-12-10
Attending: FAMILY MEDICINE
Payer: COMMERCIAL

## 2019-12-10 VITALS
OXYGEN SATURATION: 99 % | SYSTOLIC BLOOD PRESSURE: 116 MMHG | BODY MASS INDEX: 39.89 KG/M2 | TEMPERATURE: 98.3 F | HEART RATE: 100 BPM | HEIGHT: 61 IN | DIASTOLIC BLOOD PRESSURE: 76 MMHG | WEIGHT: 211.3 LBS | RESPIRATION RATE: 18 BRPM

## 2019-12-10 DIAGNOSIS — S62.324D DISPLACED FRACTURE OF SHAFT OF FOURTH METACARPAL BONE, RIGHT HAND, SUBSEQUENT ENCOUNTER FOR FRACTURE WITH ROUTINE HEALING: ICD-10-CM

## 2019-12-10 PROCEDURE — 73130 X-RAY EXAM OF HAND: CPT | Mod: TC,RT | Performed by: FAMILY MEDICINE

## 2019-12-10 PROCEDURE — 99024 POSTOP FOLLOW-UP VISIT: CPT | Performed by: FAMILY MEDICINE

## 2019-12-10 NOTE — PROGRESS NOTES
"Subjective:      Amy Correa is a 34 y.o. female who presents with Hand Injury (Referral from [facility]/ R hand injury )       Referred by KAITY Ness for evaluation of RIGHT hand pain (right-hand-dominant)    HPI   RIGHT hand pain   Date of injury, Friday, November 29, 2019  Mechanism of injury, playing with her child and inadvertently tripped on a weight causing her to fall forward and ran into a metal cabinet that  her third and fourth fingers on the RIGHT hand causing sudden sharp pain predominantly at the fourth metacarpal region  Improved with rest and immobilization  Worse with movement of the hand  Pain is predominantly at the fourth metacarpal region, she did have some forearm discomfort from her splint  Denies any prior issues with the RIGHT hand  Advil for pain which helps some    Works at a school,      Objective:     /76 (BP Location: Left arm, Patient Position: Sitting, BP Cuff Size: Large adult)   Pulse 100   Temp 36.8 °C (98.3 °F) (Temporal)   Resp 18   Ht 1.549 m (5' 1\")   Wt 95.8 kg (211 lb 4.8 oz)   SpO2 99%   BMI 39.92 kg/m²     Physical Exam     Hand exam    NAD  Alert and oriented    BILATERAL WRIST exam  Range of motion intact  No tenderness along scaphoid, TFCC insertion, distal radius or distal ulna  Tinel's testing is NEGATIVE  The hand is otherwise neurovascularly intact    BILATERAL hand exam  POSITIVE RIGHT hand swelling   MILD knuckle depression at the RIGHT fourth metacarpal head     Range of motion DECREASED at the RIGHT fourth MCP joint  NO rotational deformity  Pinky opposition NORMAL  Grind test is NEGATIVE  Collateral ligament testing is NORMAL    Assessment/Plan:     1. Displaced fracture of shaft of fourth metacarpal bone, right hand, subsequent encounter for fracture with routine healing  DX-HAND 3+ RIGHT     Date of injury, Friday, November 29, 2019  Mechanism of injury, playing with her child and inadvertently " tripped on a weight causing her to fall forward and ran into a metal cabinet that  her third and fourth fingers on the RIGHT hand causing sudden sharp pain predominantly at the fourth metacarpal region    Fracture was stabilized and custom removable ulnar gutter splint (December 3, 2019)    The plan is to continue ulnar gutter splint for a total of 4 weeks (until on or after December 31, 2019)    Patient was offered the option of orthopedic evaluation since she seems to be fairly uncomfortable and there is the concern of shortening.  Functionally, there does not appear to be any rotational deformity so hand function should be preserved when she heals.  Patient has once again elected to proceed with nonoperative management.  She has been advised that if she changes her mind we can place a referral for hand surgeon    Return in about 1 week (around 12/17/2019).  For fracture check      12/10/2019 2:52 PM     HISTORY/REASON FOR EXAM:  Pain/Deformity Following Trauma; Verify fracture stability.  Fracture follow-up     TECHNIQUE/EXAM DESCRIPTION AND NUMBER OF VIEWS:  3 views of the RIGHT hand.     COMPARISON: 12/3/2019     FINDINGS:  Stable oblique/spiral mildly displaced fracture of the fourth proximal metacarpal shaft. Alignment is stable. No significant callus formation or bony bridging is seen.  The visualized osseous structures are otherwise in anatomic alignment.  The joint spaces are preserved.  Bone mineralization is age-appropriate..        IMPRESSION:     Stable oblique mildly displaced right fourth metacarpal fracture.      12/3/2019 5:18 PM     HISTORY/REASON FOR EXAM:  Follow-up 4th metacarpal fracture.        TECHNIQUE/EXAM DESCRIPTION AND NUMBER OF VIEWS:  3 views of the RIGHT hand.     COMPARISON: 11/30/2019     FINDINGS:     There is no focal soft tissue swelling.     There is an oblique mildly displaced fracture of the right proximal/mid 4th metacarpal which is unchanged in appearance. No  significant bridging callus formation is identified during this very short time interval.     Remainder of the right hand is unremarkable.        IMPRESSION:     1.  No interval change in the mildly displaced oblique right 4th metacarpal fracture.        11/30/2019 12:09 PM     HISTORY/REASON FOR EXAM: Right hand pain.        TECHNIQUE/EXAM DESCRIPTION AND NUMBER OF VIEWS:  3 views of the RIGHT hand.     COMPARISON:     FINDINGS: Bone mineralization is normal. There is an obliquely oriented fracture involving the fourth metacarpal diaphysis. Soft tissues are normal.     IMPRESSION:     Fracture the fourth middle carpal diaphysis.    Thank you China Gardiner, A.P.N. for allowing me to participate in caring for your patient.

## 2019-12-17 ENCOUNTER — APPOINTMENT (OUTPATIENT)
Dept: RADIOLOGY | Facility: IMAGING CENTER | Age: 34
End: 2019-12-17
Attending: FAMILY MEDICINE
Payer: COMMERCIAL

## 2019-12-17 ENCOUNTER — OFFICE VISIT (OUTPATIENT)
Dept: MEDICAL GROUP | Facility: CLINIC | Age: 34
End: 2019-12-17
Payer: COMMERCIAL

## 2019-12-17 VITALS
RESPIRATION RATE: 18 BRPM | HEIGHT: 61 IN | WEIGHT: 211.3 LBS | TEMPERATURE: 98.1 F | SYSTOLIC BLOOD PRESSURE: 118 MMHG | HEART RATE: 97 BPM | BODY MASS INDEX: 39.89 KG/M2 | OXYGEN SATURATION: 99 % | DIASTOLIC BLOOD PRESSURE: 80 MMHG

## 2019-12-17 DIAGNOSIS — S62.324D DISPLACED FRACTURE OF SHAFT OF FOURTH METACARPAL BONE, RIGHT HAND, SUBSEQUENT ENCOUNTER FOR FRACTURE WITH ROUTINE HEALING: ICD-10-CM

## 2019-12-17 DIAGNOSIS — S62.334G: ICD-10-CM

## 2019-12-17 DIAGNOSIS — S62.314G: ICD-10-CM

## 2019-12-17 PROCEDURE — 99024 POSTOP FOLLOW-UP VISIT: CPT | Performed by: FAMILY MEDICINE

## 2019-12-17 PROCEDURE — 73130 X-RAY EXAM OF HAND: CPT | Mod: TC,RT | Performed by: FAMILY MEDICINE

## 2019-12-17 NOTE — PROGRESS NOTES
"Subjective:      FOLLOW UP R hand fourth metacarpal fracture     Referred by KA[facility] Ness for evaluation of RIGHT hand pain (right-hand-dominant)    HPI   RIGHT hand pain has improved  Date of injury, Friday, November 29, 2019  Mechanism of injury, playing with her child and inadvertently tripped on a weight causing her to fall forward and ran into a metal cabinet that  her third and fourth fingers on the RIGHT hand causing sudden sharp pain predominantly at the fourth metacarpal region    Works at a school,      Objective:     /80 (BP Location: Left arm, Patient Position: Sitting, BP Cuff Size: Large adult)   Pulse 97   Temp 36.7 °C (98.1 °F) (Temporal)   Resp 18   Ht 1.549 m (5' 1\")   Wt 95.8 kg (211 lb 4.8 oz)   SpO2 99%   BMI 39.92 kg/m²       Physical Exam     Hand exam    NAD  Alert and oriented    BILATERAL hand exam  LESS RIGHT hand swelling   MILD knuckle depression at the RIGHT fourth metacarpal head     Range of motion MILDLY decreased at the RIGHT fourth MCP joint  NO rotational deformity  Pinky opposition NORMAL  Grind test is NEGATIVE  Collateral ligament testing is NORMAL    Assessment/Plan:     1. Displaced fracture of shaft of fourth metacarpal bone, right hand, subsequent encounter for fracture with routine healing       Date of injury, Friday, November 29, 2019  Mechanism of injury, playing with her child and inadvertently tripped on a weight causing her to fall forward and ran into a metal cabinet that  her third and fourth fingers on the RIGHT hand causing sudden sharp pain predominantly at the fourth metacarpal region    Fracture was stabilized and custom removable ulnar gutter splint (December 3, 2019)    The plan is to continue ulnar gutter splint for a total of 4 weeks (until on or after December 31, 2019)    Patient was AGAIN offered the option of orthopedic evaluation since she seems to be fairly uncomfortable and there " is the concern of shortening.  Functionally, there does not appear to be any rotational deformity so hand function should be preserved when she heals.  Patient has once again elected to proceed with nonoperative management and was with her  at today's visit (12/17).      Fracture alignment appears to be the same and she seems to have good hand motion/function despite her recent injury    Return in about 16 days (around 1/2/2020).  To see how she is doing coming out of ulnar gutter splint        12/17/2019 10:13 AM     HISTORY/REASON FOR EXAM:  History of right hand fracture, follow-up.     TECHNIQUE/EXAM DESCRIPTION AND NUMBER OF VIEWS:  3 views of the RIGHT hand.     COMPARISON: None     FINDINGS:     BONE MINERALIZATION: Normal.  JOINTS: Preserved. No erosions.  FRACTURE: Unchanged obliquely oriented fracture of the proximal fourth metacarpal diaphysis. No periosteal reaction to suggest healing at this time.  DISLOCATION: None.  SOFT TISSUES: No mass.     IMPRESSION:     Unchanged obliquely oriented fracture of the proximal fourth metacarpal diaphysis. No periosteal reaction to suggest healing at this time.      12/10/2019 2:52 PM     HISTORY/REASON FOR EXAM:  Pain/Deformity Following Trauma; Verify fracture stability.  Fracture follow-up     TECHNIQUE/EXAM DESCRIPTION AND NUMBER OF VIEWS:  3 views of the RIGHT hand.     COMPARISON: 12/3/2019     FINDINGS:  Stable oblique/spiral mildly displaced fracture of the fourth proximal metacarpal shaft. Alignment is stable. No significant callus formation or bony bridging is seen.  The visualized osseous structures are otherwise in anatomic alignment.  The joint spaces are preserved.  Bone mineralization is age-appropriate..        IMPRESSION:     Stable oblique mildly displaced right fourth metacarpal fracture.      12/3/2019 5:18 PM     HISTORY/REASON FOR EXAM:  Follow-up 4th metacarpal fracture.        TECHNIQUE/EXAM DESCRIPTION AND NUMBER OF VIEWS:  3 views of  the RIGHT hand.     COMPARISON: 11/30/2019     FINDINGS:     There is no focal soft tissue swelling.     There is an oblique mildly displaced fracture of the right proximal/mid 4th metacarpal which is unchanged in appearance. No significant bridging callus formation is identified during this very short time interval.     Remainder of the right hand is unremarkable.        IMPRESSION:     1.  No interval change in the mildly displaced oblique right 4th metacarpal fracture.        11/30/2019 12:09 PM     HISTORY/REASON FOR EXAM: Right hand pain.        TECHNIQUE/EXAM DESCRIPTION AND NUMBER OF VIEWS:  3 views of the RIGHT hand.     COMPARISON:     FINDINGS: Bone mineralization is normal. There is an obliquely oriented fracture involving the fourth metacarpal diaphysis. Soft tissues are normal.     IMPRESSION:     Fracture the fourth middle carpal diaphysis.    Thank you China Gardiner, A.P.BELTRAN. for allowing me to participate in caring for your patient.

## 2020-01-02 ENCOUNTER — APPOINTMENT (OUTPATIENT)
Dept: RADIOLOGY | Facility: IMAGING CENTER | Age: 35
End: 2020-01-02
Attending: FAMILY MEDICINE
Payer: COMMERCIAL

## 2020-01-02 ENCOUNTER — OFFICE VISIT (OUTPATIENT)
Dept: MEDICAL GROUP | Facility: CLINIC | Age: 35
End: 2020-01-02
Payer: COMMERCIAL

## 2020-01-02 VITALS
RESPIRATION RATE: 16 BRPM | DIASTOLIC BLOOD PRESSURE: 76 MMHG | OXYGEN SATURATION: 97 % | TEMPERATURE: 98.5 F | HEART RATE: 72 BPM | WEIGHT: 211.3 LBS | HEIGHT: 61 IN | BODY MASS INDEX: 39.89 KG/M2 | SYSTOLIC BLOOD PRESSURE: 118 MMHG

## 2020-01-02 DIAGNOSIS — S62.314G: ICD-10-CM

## 2020-01-02 PROCEDURE — 73130 X-RAY EXAM OF HAND: CPT | Mod: TC,RT | Performed by: FAMILY MEDICINE

## 2020-01-02 PROCEDURE — 99024 POSTOP FOLLOW-UP VISIT: CPT | Performed by: FAMILY MEDICINE

## 2020-01-02 NOTE — PROGRESS NOTES
"Subjective:      FOLLOW UP R hand fourth metacarpal fracture     Referred by KA[facility] Ness for evaluation of RIGHT hand pain (right-hand-dominant)    HPI   RIGHT hand pain has improved  Date of injury, Friday, November 29, 2019  Mechanism of injury, playing with her child and inadvertently tripped on a weight causing her to fall forward and ran into a metal cabinet that  her third and fourth fingers on the RIGHT hand causing sudden sharp pain predominantly at the fourth metacarpal region    She is here for fracture check  No fracture site pain  She is now experiencing some pain at the wrist  She is still wearing her ulnar gutter splint    Works at a school,      Objective:     /76 (BP Location: Left arm, Patient Position: Sitting, BP Cuff Size: Adult)   Pulse 72   Temp 36.9 °C (98.5 °F) (Temporal)   Resp 16   Ht 1.549 m (5' 1\")   Wt 95.8 kg (211 lb 4.8 oz)   SpO2 97%   BMI 39.92 kg/m²     Physical Exam     Hand exam    NAD  Alert and oriented    BILATERAL hand exam  NO RIGHT hand swelling   MILD knuckle depression at the RIGHT fourth metacarpal head     Range of motion MILDLY decreased at the RIGHT fourth MCP joint  NO rotational deformity  Pinky opposition NORMAL  Grind test is NEGATIVE  Collateral ligament testing is NORMAL    Assessment/Plan:     1. Closed displaced fracture of base of fourth metacarpal bone of right hand with delayed healing, subsequent encounter  DX-HAND 3+ RIGHT     Date of injury, Friday, November 29, 2019  Mechanism of injury, playing with her child and inadvertently tripped on a weight causing her to fall forward and ran into a metal cabinet that  her third and fourth fingers on the RIGHT hand causing sudden sharp pain predominantly at the fourth metacarpal region    Fracture was stabilized and custom removable ulnar gutter splint (December 3, 2019)    The plan is to continue ulnar gutter splint for a total of 4 weeks " (until on or after December 31, 2019)    Clinically she is HEALING  She is not tender at the fracture site on examination  There is no palpable motion at the fracture site  Fracture alignment is identical to prior studies suggesting there is no fracture site motion  Advised to discontinue ulnar gutter splint    Returned to work with restriction of limited use of the RIGHT hand and avoiding lifting greater than 10 pounds.      Return in about 2 weeks (around 1/16/2020).  To see how she is doing    1/2/2020 1:34 PM     HISTORY/REASON FOR EXAM: Right hand pain. Right 4th metacarpal fracture follow-up.        TECHNIQUE/EXAM DESCRIPTION AND NUMBER OF VIEWS:  3 views of the RIGHT hand.     COMPARISON: 12/17/2019, 12/10/2019, 12/3/2019, and 11/30/2019     FINDINGS:  And oblique minimally displaced fracture of the proximal metaphysis/diaphysis of the 4th metacarpal is unchanged in alignment.  No callus formation is identified.  The fracture is without significant change compared with initial examination of 11/30/2019.  The remainder the right hand is intact.     IMPRESSION:     1.  Unchanging appearance of minimally displaced oblique fracture of the proximal metaphysis/diaphysis of the right 4th metacarpal.     2.  No radiographic evidence of healing compared with imaging dating to 11/30/2019. This may reflect persistent motion of the fracture site.      12/17/2019 10:13 AM     HISTORY/REASON FOR EXAM:  History of right hand fracture, follow-up.     TECHNIQUE/EXAM DESCRIPTION AND NUMBER OF VIEWS:  3 views of the RIGHT hand.     COMPARISON: None     FINDINGS:     BONE MINERALIZATION: Normal.  JOINTS: Preserved. No erosions.  FRACTURE: Unchanged obliquely oriented fracture of the proximal fourth metacarpal diaphysis. No periosteal reaction to suggest healing at this time.  DISLOCATION: None.  SOFT TISSUES: No mass.     IMPRESSION:     Unchanged obliquely oriented fracture of the proximal fourth metacarpal diaphysis. No  periosteal reaction to suggest healing at this time.      12/10/2019 2:52 PM     HISTORY/REASON FOR EXAM:  Pain/Deformity Following Trauma; Verify fracture stability.  Fracture follow-up     TECHNIQUE/EXAM DESCRIPTION AND NUMBER OF VIEWS:  3 views of the RIGHT hand.     COMPARISON: 12/3/2019     FINDINGS:  Stable oblique/spiral mildly displaced fracture of the fourth proximal metacarpal shaft. Alignment is stable. No significant callus formation or bony bridging is seen.  The visualized osseous structures are otherwise in anatomic alignment.  The joint spaces are preserved.  Bone mineralization is age-appropriate..        IMPRESSION:     Stable oblique mildly displaced right fourth metacarpal fracture.      12/3/2019 5:18 PM     HISTORY/REASON FOR EXAM:  Follow-up 4th metacarpal fracture.        TECHNIQUE/EXAM DESCRIPTION AND NUMBER OF VIEWS:  3 views of the RIGHT hand.     COMPARISON: 11/30/2019     FINDINGS:     There is no focal soft tissue swelling.     There is an oblique mildly displaced fracture of the right proximal/mid 4th metacarpal which is unchanged in appearance. No significant bridging callus formation is identified during this very short time interval.     Remainder of the right hand is unremarkable.        IMPRESSION:     1.  No interval change in the mildly displaced oblique right 4th metacarpal fracture.        11/30/2019 12:09 PM     HISTORY/REASON FOR EXAM: Right hand pain.        TECHNIQUE/EXAM DESCRIPTION AND NUMBER OF VIEWS:  3 views of the RIGHT hand.     COMPARISON:     FINDINGS: Bone mineralization is normal. There is an obliquely oriented fracture involving the fourth metacarpal diaphysis. Soft tissues are normal.     IMPRESSION:     Fracture the fourth middle carpal diaphysis.    Thank you China Gardiner, A.PSALUD for allowing me to participate in caring for your patient.

## 2020-01-02 NOTE — LETTER
January 2, 2020         Patient: Amy Correa   YOB: 1985   Date of Visit: 1/2/2020           To Whom it May Concern:    Amy Correa was seen in my clinic on 1/2/2020. She may return to work, but she should limit the use of her RIGHT hand and avoid lifting greater than 10 pounds until reevaluation in 2 weeks.    If you have any questions or concerns, please don't hesitate to call.        Sincerely,           Collins Telles M.D.  Electronically Signed

## 2020-01-16 ENCOUNTER — OFFICE VISIT (OUTPATIENT)
Dept: MEDICAL GROUP | Facility: CLINIC | Age: 35
End: 2020-01-16
Payer: COMMERCIAL

## 2020-01-16 VITALS
TEMPERATURE: 98.5 F | HEIGHT: 61 IN | DIASTOLIC BLOOD PRESSURE: 78 MMHG | BODY MASS INDEX: 39.89 KG/M2 | HEART RATE: 82 BPM | SYSTOLIC BLOOD PRESSURE: 120 MMHG | OXYGEN SATURATION: 97 % | WEIGHT: 211.3 LBS | RESPIRATION RATE: 16 BRPM

## 2020-01-16 DIAGNOSIS — M25.641 JOINT STIFFNESS OF HAND, RIGHT: ICD-10-CM

## 2020-01-16 DIAGNOSIS — S62.314G: ICD-10-CM

## 2020-01-16 PROCEDURE — 99212 OFFICE O/P EST SF 10 MIN: CPT | Mod: 24 | Performed by: FAMILY MEDICINE

## 2020-02-06 ENCOUNTER — OFFICE VISIT (OUTPATIENT)
Dept: MEDICAL GROUP | Facility: CLINIC | Age: 35
End: 2020-02-06
Payer: COMMERCIAL

## 2020-02-06 VITALS
WEIGHT: 211.3 LBS | TEMPERATURE: 98.7 F | RESPIRATION RATE: 18 BRPM | HEART RATE: 91 BPM | SYSTOLIC BLOOD PRESSURE: 122 MMHG | BODY MASS INDEX: 39.89 KG/M2 | HEIGHT: 61 IN | OXYGEN SATURATION: 96 % | DIASTOLIC BLOOD PRESSURE: 78 MMHG

## 2020-02-06 DIAGNOSIS — M25.641 JOINT STIFFNESS OF HAND, RIGHT: ICD-10-CM

## 2020-02-06 DIAGNOSIS — S62.314G: ICD-10-CM

## 2020-02-06 PROCEDURE — 99024 POSTOP FOLLOW-UP VISIT: CPT | Performed by: FAMILY MEDICINE

## 2020-02-06 NOTE — PROGRESS NOTES
"Subjective:      FOLLOW UP R hand fourth metacarpal fracture     Referred by KA[facility] Ness for evaluation of RIGHT hand pain (right-hand-dominant)    HPI   RIGHT hand pain has improved  Date of injury, Friday, November 29, 2019  Mechanism of injury, playing with her child and inadvertently tripped on a weight causing her to fall forward and ran into a metal cabinet that  her third and fourth fingers on the RIGHT hand causing sudden sharp pain predominantly at the fourth metacarpal region    She is here for fracture check  No fracture site pain  Still having some occasional pain if she inadvertently taps the fourth finger while performing activity  Hand function has IMPROVED  She is starting hand therapy shortly, she is still not had her initial session    Works at a school,      Objective:     /78 (BP Location: Left arm, Patient Position: Sitting, BP Cuff Size: Adult)   Pulse 91   Temp 37.1 °C (98.7 °F) (Temporal)   Resp 18   Ht 1.549 m (5' 1\")   Wt 95.8 kg (211 lb 4.8 oz)   SpO2 96%   BMI 39.92 kg/m²       Physical Exam     Hand exam    NAD  Alert and oriented    BILATERAL hand exam  NO RIGHT hand swelling   MILD knuckle depression at the RIGHT fourth metacarpal head   NO rotational deformity  No fracture site tenderness  Pinky opposition NORMAL  Grind test is NEGATIVE  Collateral ligament testing is NORMAL    Assessment/Plan:     1. Closed displaced fracture of base of fourth metacarpal bone of right hand with delayed healing, subsequent encounter     2. Joint stiffness of hand, right       Date of injury, Friday, November 29, 2019  Mechanism of injury, playing with her child and inadvertently tripped on a weight causing her to fall forward and ran into a metal cabinet that  her third and fourth fingers on the RIGHT hand causing sudden sharp pain predominantly at the fourth metacarpal region    Fracture was stabilized and custom removable ulnar " gutter splint (December 3, 2019)  She is out of her ulnar gutter splint, total of 4 weeks (December 31, 2019)    Clinically she is HEALING  She is not tender at the fracture site on examination  She is having some reluctance to using the hand since she has some pain with inadvertently tapping the finger in certain situations    Pending initial visit for hand therapy    Return in about 4 weeks (around 3/5/2020).  To see how she is doing with hand therapy, that should be her last visit    1/2/2020 1:34 PM     HISTORY/REASON FOR EXAM: Right hand pain. Right 4th metacarpal fracture follow-up.        TECHNIQUE/EXAM DESCRIPTION AND NUMBER OF VIEWS:  3 views of the RIGHT hand.     COMPARISON: 12/17/2019, 12/10/2019, 12/3/2019, and 11/30/2019     FINDINGS:  And oblique minimally displaced fracture of the proximal metaphysis/diaphysis of the 4th metacarpal is unchanged in alignment.  No callus formation is identified.  The fracture is without significant change compared with initial examination of 11/30/2019.  The remainder the right hand is intact.     IMPRESSION:     1.  Unchanging appearance of minimally displaced oblique fracture of the proximal metaphysis/diaphysis of the right 4th metacarpal.     2.  No radiographic evidence of healing compared with imaging dating to 11/30/2019. This may reflect persistent motion of the fracture site.      12/17/2019 10:13 AM     HISTORY/REASON FOR EXAM:  History of right hand fracture, follow-up.     TECHNIQUE/EXAM DESCRIPTION AND NUMBER OF VIEWS:  3 views of the RIGHT hand.     COMPARISON: None     FINDINGS:     BONE MINERALIZATION: Normal.  JOINTS: Preserved. No erosions.  FRACTURE: Unchanged obliquely oriented fracture of the proximal fourth metacarpal diaphysis. No periosteal reaction to suggest healing at this time.  DISLOCATION: None.  SOFT TISSUES: No mass.     IMPRESSION:     Unchanged obliquely oriented fracture of the proximal fourth metacarpal diaphysis. No periosteal  reaction to suggest healing at this time.      12/10/2019 2:52 PM     HISTORY/REASON FOR EXAM:  Pain/Deformity Following Trauma; Verify fracture stability.  Fracture follow-up     TECHNIQUE/EXAM DESCRIPTION AND NUMBER OF VIEWS:  3 views of the RIGHT hand.     COMPARISON: 12/3/2019     FINDINGS:  Stable oblique/spiral mildly displaced fracture of the fourth proximal metacarpal shaft. Alignment is stable. No significant callus formation or bony bridging is seen.  The visualized osseous structures are otherwise in anatomic alignment.  The joint spaces are preserved.  Bone mineralization is age-appropriate..        IMPRESSION:     Stable oblique mildly displaced right fourth metacarpal fracture.      12/3/2019 5:18 PM     HISTORY/REASON FOR EXAM:  Follow-up 4th metacarpal fracture.        TECHNIQUE/EXAM DESCRIPTION AND NUMBER OF VIEWS:  3 views of the RIGHT hand.     COMPARISON: 11/30/2019     FINDINGS:     There is no focal soft tissue swelling.     There is an oblique mildly displaced fracture of the right proximal/mid 4th metacarpal which is unchanged in appearance. No significant bridging callus formation is identified during this very short time interval.     Remainder of the right hand is unremarkable.        IMPRESSION:     1.  No interval change in the mildly displaced oblique right 4th metacarpal fracture.        11/30/2019 12:09 PM     HISTORY/REASON FOR EXAM: Right hand pain.        TECHNIQUE/EXAM DESCRIPTION AND NUMBER OF VIEWS:  3 views of the RIGHT hand.     COMPARISON:     FINDINGS: Bone mineralization is normal. There is an obliquely oriented fracture involving the fourth metacarpal diaphysis. Soft tissues are normal.     IMPRESSION:     Fracture the fourth middle carpal diaphysis.    Thank you KAITY Ness for allowing me to participate in caring for your patient.

## 2020-02-18 ENCOUNTER — OFFICE VISIT (OUTPATIENT)
Dept: URGENT CARE | Facility: MEDICAL CENTER | Age: 35
End: 2020-02-18
Payer: COMMERCIAL

## 2020-02-18 VITALS
TEMPERATURE: 97.5 F | WEIGHT: 212 LBS | HEIGHT: 61 IN | SYSTOLIC BLOOD PRESSURE: 106 MMHG | RESPIRATION RATE: 16 BRPM | DIASTOLIC BLOOD PRESSURE: 68 MMHG | HEART RATE: 84 BPM | BODY MASS INDEX: 40.02 KG/M2 | OXYGEN SATURATION: 99 %

## 2020-02-18 DIAGNOSIS — K13.79 SORE IN MOUTH: ICD-10-CM

## 2020-02-18 DIAGNOSIS — B34.9 VIRAL INFECTION: ICD-10-CM

## 2020-02-18 DIAGNOSIS — J01.00 ACUTE NON-RECURRENT MAXILLARY SINUSITIS: ICD-10-CM

## 2020-02-18 PROCEDURE — 99213 OFFICE O/P EST LOW 20 MIN: CPT | Performed by: NURSE PRACTITIONER

## 2020-02-18 ASSESSMENT — ENCOUNTER SYMPTOMS
HOARSE VOICE: 1
CHILLS: 0
NAUSEA: 0
HEADACHES: 0
SPUTUM PRODUCTION: 0
DOUBLE VISION: 0
NECK PAIN: 0
SINUS PRESSURE: 0
PALPITATIONS: 0
SHORTNESS OF BREATH: 0
SINUS PAIN: 0
SORE THROAT: 0
VOMITING: 0
SWOLLEN GLANDS: 0
FEVER: 0
MYALGIAS: 0
COUGH: 0
DIARRHEA: 0
ABDOMINAL PAIN: 0
BLURRED VISION: 0

## 2020-02-18 NOTE — PATIENT INSTRUCTIONS
Sinusitis, Adult  Sinusitis is soreness and inflammation of your sinuses. Sinuses are hollow spaces in the bones around your face. They are located:  · Around your eyes.  · In the middle of your forehead.  · Behind your nose.  · In your cheekbones.  Your sinuses and nasal passages are lined with a stringy fluid (mucus). Mucus normally drains out of your sinuses. When your nasal tissues get inflamed or swollen, the mucus can get trapped or blocked so air cannot flow through your sinuses. This lets bacteria, viruses, and funguses grow, and that leads to infection.  Follow these instructions at home:  Medicines  · Take, use, or apply over-the-counter and prescription medicines only as told by your doctor. These may include nasal sprays.  · If you were prescribed an antibiotic medicine, take it as told by your doctor. Do not stop taking the antibiotic even if you start to feel better.  Hydrate and Humidify  · Drink enough water to keep your pee (urine) clear or pale yellow.  · Use a cool mist humidifier to keep the humidity level in your home above 50%.  · Breathe in steam for 10-15 minutes, 3-4 times a day or as told by your doctor. You can do this in the bathroom while a hot shower is running.  · Try not to spend time in cool or dry air.  Rest  · Rest as much as possible.  · Sleep with your head raised (elevated).  · Make sure to get enough sleep each night.  General instructions  · Put a warm, moist washcloth on your face 3-4 times a day or as told by your doctor. This will help with discomfort.  · Wash your hands often with soap and water. If there is no soap and water, use hand .  · Do not smoke. Avoid being around people who are smoking (secondhand smoke).  · Keep all follow-up visits as told by your doctor. This is important.  Contact a doctor if:  · You have a fever.  · Your symptoms get worse.  · Your symptoms do not get better within 10 days.  Get help right away if:  · You have a very bad  "headache.  · You cannot stop throwing up (vomiting).  · You have pain or swelling around your face or eyes.  · You have trouble seeing.  · You feel confused.  · Your neck is stiff.  · You have trouble breathing.  This information is not intended to replace advice given to you by your health care provider. Make sure you discuss any questions you have with your health care provider.  Document Released: 06/05/2009 Document Revised: 08/13/2017 Document Reviewed: 10/12/2016  [employer] Interactive Patient Education © 2017 [employer]  Upper Respiratory Infection, Adult  Most upper respiratory infections (URIs) are caused by a virus. A URI affects the nose, throat, and upper air passages. The most common type of URI is often called \"the common cold.\"  Follow these instructions at home:  · Take medicines only as told by your doctor.  · Gargle warm saltwater or take cough drops to comfort your throat as told by your doctor.  · Use a warm mist humidifier or inhale steam from a shower to increase air moisture. This may make it easier to breathe.  · Drink enough fluid to keep your pee (urine) clear or pale yellow.  · Eat soups and other clear broths.  · Have a healthy diet.  · Rest as needed.  · Go back to work when your fever is gone or your doctor says it is okay.  ¨ You may need to stay home longer to avoid giving your URI to others.  ¨ You can also wear a face mask and wash your hands often to prevent spread of the virus.  · Use your inhaler more if you have asthma.  · Do not use any tobacco products, including cigarettes, chewing tobacco, or electronic cigarettes. If you need help quitting, ask your doctor.  Contact a doctor if:  · You are getting worse, not better.  · Your symptoms are not helped by medicine.  · You have chills.  · You are getting more short of breath.  · You have brown or red mucus.  · You have yellow or brown discharge from your nose.  · You have pain in your face, especially when you bend " forward.  · You have a fever.  · You have puffy (swollen) neck glands.  · You have pain while swallowing.  · You have white areas in the back of your throat.  Get help right away if:  · You have very bad or constant:  ¨ Headache.  ¨ Ear pain.  ¨ Pain in your forehead, behind your eyes, and over your cheekbones (sinus pain).  ¨ Chest pain.  · You have long-lasting (chronic) lung disease and any of the following:  ¨ Wheezing.  ¨ Long-lasting cough.  ¨ Coughing up blood.  ¨ A change in your usual mucus.  · You have a stiff neck.  · You have changes in your:  ¨ Vision.  ¨ Hearing.  ¨ Thinking.  ¨ Mood.  This information is not intended to replace advice given to you by your health care provider. Make sure you discuss any questions you have with your health care provider.  Document Released: 06/05/2009 Document Revised: 08/20/2017 Document Reviewed: 03/25/2015  [employer] Interactive Patient Education © 2017 [employer]

## 2020-02-18 NOTE — PROGRESS NOTES
Subjective:     Amy Correa is a 34 y.o. female who presents for Nasal Congestion (woke up this morning with congestion, ear pressure, blister on tongue yesterday)      Sinus Problem   This is a new problem. The current episode started today. The problem is unchanged. There has been no fever. Her pain is at a severity of 0/10. Associated symptoms include congestion and a hoarse voice. Pertinent negatives include no chills, coughing, ear pain, headaches, neck pain, shortness of breath, sinus pressure, sneezing, sore throat or swollen glands. (Pos bilateral ear pressure) Past treatments include nothing.         Review of Systems   Constitutional: Positive for malaise/fatigue. Negative for chills and fever.   HENT: Positive for congestion and hoarse voice. Negative for ear discharge, ear pain, hearing loss, sinus pressure, sinus pain, sneezing and sore throat.         Muffled voices due to ear congestion.   Eyes: Negative for blurred vision and double vision.   Respiratory: Negative for cough, sputum production and shortness of breath.    Cardiovascular: Negative for chest pain and palpitations.   Gastrointestinal: Negative for abdominal pain, diarrhea, nausea and vomiting.   Genitourinary: Negative for dysuria and urgency.   Musculoskeletal: Negative for myalgias and neck pain.   Skin: Negative for rash.   Neurological: Negative for headaches.   All other systems reviewed and are negative.      PMH:   Past Medical History:   Diagnosis Date   • Known health problems: none    • Microcytic anemia 6/29/2017   • Subclinical hypothyroidism 6/29/2017   • Urinary incontinence      ALLERGIES:   Allergies   Allergen Reactions   • Penicillins Rash     SURGHX:   Past Surgical History:   Procedure Laterality Date   • CYSTOSCOPY STENT PLACEMENT Right 5/7/2018    Procedure: CYSTOSCOPY;  Surgeon: Osmel Seo M.D.;  Location: SURGERY [facility];  Service: Urology   • URETEROSCOPY Right 5/7/2018    Procedure:  URETEROSCOPY;  Surgeon: Osmel Seo M.D.;  Location: SURGERY [facility];  Service: Urology   • DILATION AND CURETTAGE      2011, missed carriage   • HERNIA REPAIR      9 yrs old, umbilical hernia     SOCHX:   Social History     Socioeconomic History   • Marital status:      Spouse name: Not on file   • Number of children: Not on file   • Years of education: Not on file   • Highest education level: Not on file   Occupational History   • Not on file   Social Needs   • Financial resource strain: Not on file   • Food insecurity     Worry: Not on file     Inability: Not on file   • Transportation needs     Medical: Not on file     Non-medical: Not on file   Tobacco Use   • Smoking status: Never Smoker   • Smokeless tobacco: Never Used   Substance and Sexual Activity   • Alcohol use: Yes     Alcohol/week: 0.0 oz     Comment: 1 per month   • Drug use: No   • Sexual activity: Yes     Partners: Male     Birth control/protection: Condom   Lifestyle   • Physical activity     Days per week: Not on file     Minutes per session: Not on file   • Stress: Not on file   Relationships   • Social connections     Talks on phone: Not on file     Gets together: Not on file     Attends [employer] service: Not on file     Active member of club or organization: Not on file     Attends meetings of clubs or organizations: Not on file     Relationship status: Not on file   • Intimate partner violence     Fear of current or ex partner: Not on file     Emotionally abused: Not on file     Physically abused: Not on file     Forced sexual activity: Not on file   Other Topics Concern   • Not on file   Social History Narrative   • Not on file     FH:   Family History   Problem Relation Age of Onset   • Diabetes Maternal Uncle    • Diabetes Maternal Grandmother    • No Known Problems Mother    • No Known Problems Sister    • Heart Disease Maternal Grandfather    • No Known Problems Paternal Grandmother    • No Known Problems  "Paternal Grandfather          Objective:   /68   Pulse 84   Temp 36.4 °C (97.5 °F) (Temporal)   Resp 16   Ht 1.549 m (5' 1\")   Wt 96.2 kg (212 lb)   SpO2 99%   BMI 40.06 kg/m²     Physical Exam  Vitals signs and nursing note reviewed.   Constitutional:       General: She is not in acute distress.     Appearance: Normal appearance. She is not ill-appearing.   HENT:      Head: Normocephalic and atraumatic.      Comments: Negative for visible tongue lesion     Right Ear: Tympanic membrane, ear canal and external ear normal. There is no impacted cerumen.      Left Ear: Tympanic membrane, ear canal and external ear normal. There is no impacted cerumen.      Nose: No congestion or rhinorrhea.      Right Turbinates: Enlarged and swollen.      Left Turbinates: Enlarged and swollen.      Right Sinus: No maxillary sinus tenderness or frontal sinus tenderness.      Left Sinus: No maxillary sinus tenderness or frontal sinus tenderness.      Mouth/Throat:      Mouth: Mucous membranes are moist.      Pharynx: Uvula midline. No oropharyngeal exudate, posterior oropharyngeal erythema or uvula swelling.      Tonsils: No tonsillar exudate or tonsillar abscesses.   Eyes:      Extraocular Movements: Extraocular movements intact.      Pupils: Pupils are equal, round, and reactive to light.   Neck:      Musculoskeletal: Normal range of motion and neck supple.   Cardiovascular:      Rate and Rhythm: Normal rate and regular rhythm.      Heart sounds: Normal heart sounds.   Pulmonary:      Effort: Pulmonary effort is normal. No respiratory distress.      Breath sounds: Normal breath sounds. No stridor. No wheezing, rhonchi or rales.   Chest:      Chest wall: No tenderness.   Abdominal:      General: Abdomen is flat. There is no distension.   Musculoskeletal:         General: No swelling or tenderness.   Skin:     General: Skin is warm and dry.      Findings: No rash.   Neurological:      General: No focal deficit present.      " Mental Status: She is alert and oriented to person, place, and time. Mental status is at baseline.   Psychiatric:         Mood and Affect: Mood normal.         Behavior: Behavior normal.         Thought Content: Thought content normal.         Judgment: Judgment normal.         Assessment/Plan:     1. Sore in mouth     2. Acute non-recurrent maxillary sinusitis     3. Viral infection       We discussed using Peridex for mouth sore on tongue. We discussed supportive measures including humidifier, Sudafed, nasal steroid for no longer than  3 days rest  and increased fluids.  She was encouraged to seek treatment back in the ER or urgent care for worsening symptoms,  fever greater than 101, wheezes or shortness of breath.  AVS handout given and reviewed with patient. Pt educated on red flags and when to seek treatment back in ER or UC.

## 2020-02-21 ENCOUNTER — OFFICE VISIT (OUTPATIENT)
Dept: MEDICAL GROUP | Age: 35
End: 2020-02-21
Payer: COMMERCIAL

## 2020-02-21 ENCOUNTER — HOSPITAL ENCOUNTER (OUTPATIENT)
Dept: LAB | Facility: MEDICAL CENTER | Age: 35
End: 2020-02-21
Attending: FAMILY MEDICINE
Payer: COMMERCIAL

## 2020-02-21 VITALS
TEMPERATURE: 97.7 F | BODY MASS INDEX: 40.02 KG/M2 | OXYGEN SATURATION: 100 % | HEIGHT: 61 IN | HEART RATE: 85 BPM | DIASTOLIC BLOOD PRESSURE: 68 MMHG | WEIGHT: 212 LBS | SYSTOLIC BLOOD PRESSURE: 114 MMHG

## 2020-02-21 DIAGNOSIS — Z83.3 FHX: DIABETES MELLITUS: ICD-10-CM

## 2020-02-21 DIAGNOSIS — L83 ACANTHOSIS NIGRICANS: ICD-10-CM

## 2020-02-21 DIAGNOSIS — Z00.00 PE (PHYSICAL EXAM), ANNUAL: ICD-10-CM

## 2020-02-21 DIAGNOSIS — J34.3 HYPERTROPHY OF BOTH INFERIOR NASAL TURBINATES: ICD-10-CM

## 2020-02-21 DIAGNOSIS — E66.01 MORBID OBESITY (HCC): ICD-10-CM

## 2020-02-21 DIAGNOSIS — J01.40 ACUTE NON-RECURRENT PANSINUSITIS: ICD-10-CM

## 2020-02-21 DIAGNOSIS — E03.8 SUBCLINICAL HYPOTHYROIDISM: ICD-10-CM

## 2020-02-21 DIAGNOSIS — Z00.00 PE (PHYSICAL EXAM), ANNUAL: Primary | ICD-10-CM

## 2020-02-21 PROBLEM — E66.9 OBESITY (BMI 35.0-39.9 WITHOUT COMORBIDITY): Status: RESOLVED | Noted: 2018-01-25 | Resolved: 2020-02-21

## 2020-02-21 PROBLEM — N30.00 ACUTE CYSTITIS WITHOUT HEMATURIA: Status: RESOLVED | Noted: 2018-02-05 | Resolved: 2020-02-21

## 2020-02-21 PROBLEM — E66.9 OBESITY (BMI 30-39.9): Status: RESOLVED | Noted: 2017-06-26 | Resolved: 2020-02-21

## 2020-02-21 PROBLEM — N20.1 RIGHT URETERAL STONE: Status: RESOLVED | Noted: 2018-02-08 | Resolved: 2020-02-21

## 2020-02-21 PROBLEM — S62.334G: Status: RESOLVED | Noted: 2019-12-17 | Resolved: 2020-02-21

## 2020-02-21 PROBLEM — N13.2 HYDRONEPHROSIS WITH URINARY OBSTRUCTION DUE TO URETERAL CALCULUS: Status: RESOLVED | Noted: 2018-02-08 | Resolved: 2020-02-21

## 2020-02-21 PROBLEM — D50.9 MICROCYTIC ANEMIA: Status: RESOLVED | Noted: 2017-06-29 | Resolved: 2020-02-21

## 2020-02-21 PROBLEM — M54.50 ACUTE RIGHT-SIDED LOW BACK PAIN WITHOUT SCIATICA: Status: RESOLVED | Noted: 2018-02-05 | Resolved: 2020-02-21

## 2020-02-21 LAB
25(OH)D3 SERPL-MCNC: 22 NG/ML (ref 30–100)
ALBUMIN SERPL BCP-MCNC: 4.2 G/DL (ref 3.2–4.9)
ALBUMIN/GLOB SERPL: 1.1 G/DL
ALP SERPL-CCNC: 77 U/L (ref 30–99)
ALT SERPL-CCNC: 23 U/L (ref 2–50)
ANION GAP SERPL CALC-SCNC: 10 MMOL/L (ref 0–11.9)
AST SERPL-CCNC: 36 U/L (ref 12–45)
BASOPHILS # BLD AUTO: 0.5 % (ref 0–1.8)
BASOPHILS # BLD: 0.03 K/UL (ref 0–0.12)
BILIRUB SERPL-MCNC: 0.5 MG/DL (ref 0.1–1.5)
BUN SERPL-MCNC: 9 MG/DL (ref 8–22)
CALCIUM SERPL-MCNC: 8.7 MG/DL (ref 8.5–10.5)
CHLORIDE SERPL-SCNC: 105 MMOL/L (ref 96–112)
CHOLEST SERPL-MCNC: 151 MG/DL (ref 100–199)
CO2 SERPL-SCNC: 24 MMOL/L (ref 20–33)
CREAT SERPL-MCNC: 0.65 MG/DL (ref 0.5–1.4)
EOSINOPHIL # BLD AUTO: 0.16 K/UL (ref 0–0.51)
EOSINOPHIL NFR BLD: 2.6 % (ref 0–6.9)
ERYTHROCYTE [DISTWIDTH] IN BLOOD BY AUTOMATED COUNT: 52.8 FL (ref 35.9–50)
EST. AVERAGE GLUCOSE BLD GHB EST-MCNC: 114 MG/DL
FASTING STATUS PATIENT QL REPORTED: NORMAL
GLOBULIN SER CALC-MCNC: 4 G/DL (ref 1.9–3.5)
GLUCOSE SERPL-MCNC: 80 MG/DL (ref 65–99)
HBA1C MFR BLD: 5.6 % (ref 0–5.6)
HCT VFR BLD AUTO: 40.2 % (ref 37–47)
HDLC SERPL-MCNC: 44 MG/DL
HGB BLD-MCNC: 12.1 G/DL (ref 12–16)
IMM GRANULOCYTES # BLD AUTO: 0.04 K/UL (ref 0–0.11)
IMM GRANULOCYTES NFR BLD AUTO: 0.7 % (ref 0–0.9)
LDLC SERPL CALC-MCNC: 94 MG/DL
LYMPHOCYTES # BLD AUTO: 1.53 K/UL (ref 1–4.8)
LYMPHOCYTES NFR BLD: 25.2 % (ref 22–41)
MCH RBC QN AUTO: 24.7 PG (ref 27–33)
MCHC RBC AUTO-ENTMCNC: 30.1 G/DL (ref 33.6–35)
MCV RBC AUTO: 82.2 FL (ref 81.4–97.8)
MONOCYTES # BLD AUTO: 0.61 K/UL (ref 0–0.85)
MONOCYTES NFR BLD AUTO: 10 % (ref 0–13.4)
NEUTROPHILS # BLD AUTO: 3.7 K/UL (ref 2–7.15)
NEUTROPHILS NFR BLD: 61 % (ref 44–72)
NRBC # BLD AUTO: 0 K/UL
NRBC BLD-RTO: 0 /100 WBC
PLATELET # BLD AUTO: 289 K/UL (ref 164–446)
PMV BLD AUTO: 10.6 FL (ref 9–12.9)
POTASSIUM SERPL-SCNC: 3.9 MMOL/L (ref 3.6–5.5)
PROT SERPL-MCNC: 8.2 G/DL (ref 6–8.2)
RBC # BLD AUTO: 4.89 M/UL (ref 4.2–5.4)
SODIUM SERPL-SCNC: 139 MMOL/L (ref 135–145)
TRIGL SERPL-MCNC: 63 MG/DL (ref 0–149)
TSH SERPL DL<=0.005 MIU/L-ACNC: 5.12 UIU/ML (ref 0.38–5.33)
WBC # BLD AUTO: 6.1 K/UL (ref 4.8–10.8)

## 2020-02-21 PROCEDURE — 80053 COMPREHEN METABOLIC PANEL: CPT

## 2020-02-21 PROCEDURE — 83036 HEMOGLOBIN GLYCOSYLATED A1C: CPT

## 2020-02-21 PROCEDURE — 82306 VITAMIN D 25 HYDROXY: CPT

## 2020-02-21 PROCEDURE — 80061 LIPID PANEL: CPT

## 2020-02-21 PROCEDURE — 84443 ASSAY THYROID STIM HORMONE: CPT

## 2020-02-21 PROCEDURE — 36415 COLL VENOUS BLD VENIPUNCTURE: CPT

## 2020-02-21 PROCEDURE — 85025 COMPLETE CBC W/AUTO DIFF WBC: CPT

## 2020-02-21 PROCEDURE — 99395 PREV VISIT EST AGE 18-39: CPT | Performed by: FAMILY MEDICINE

## 2020-02-21 RX ORDER — GUAIFENESIN 1200 MG/1
1 TABLET, EXTENDED RELEASE ORAL 2 TIMES DAILY
Qty: 20 TAB | Refills: 0 | Status: SHIPPED | OUTPATIENT
Start: 2020-02-21 | End: 2020-03-02

## 2020-02-21 RX ORDER — DOXYCYCLINE 100 MG/1
100 CAPSULE ORAL 2 TIMES DAILY
Qty: 10 CAP | Refills: 0 | Status: SHIPPED | OUTPATIENT
Start: 2020-02-21 | End: 2020-02-26

## 2020-02-21 NOTE — PROGRESS NOTES
Subjective:   CC: annual PE     HPI:     Amy Correa is a 34 y.o. female who is an established patient of the clinic, presents with the following concerns:     Pt was in her normal state of health until several days ago when she develops nasal congestion, ear pressure, and blisters on her tongue. She was seen by  on 2/18/2020. She was treated with Sudafed, nasal steroid w/o relief. She has been taking Sudafed at night which interfere with sleep. Her symptoms have not improved. She c/o worsening forehead pressure and facial pain. She feels tired and develops intermittent headache. She denies fever, chills, sore throat, or cough.     Patient states she has been feeling like she has had decreased energy. She is unsure if she snores at night. She has been feeling tired when she wakes up in the morning. Her BMI today is 40.06. she gains almost 40 lbs since her last OV with me in 2017. Patient states she is interested in doing a weight loss program if there are any available. Her maternal grandmother and uncle have history of diabetes. She was found to have subclinical hypothyroidism a few years ago, but lost follow up.       Current medicines (including changes today)  Current Outpatient Medications   Medication Sig Dispense Refill   • doxycycline (MONODOX) 100 MG capsule Take 1 Cap by mouth 2 times a day for 5 days. 10 Cap 0   • Guaifenesin 1200 MG TABLET SR 12 HR Take 1 Tab by mouth 2 Times a Day for 10 days. 20 Tab 0     No current facility-administered medications for this visit.      She  has a past medical history of Known health problems: none, Microcytic anemia (6/29/2017), Subclinical hypothyroidism (6/29/2017), and Urinary incontinence.    I personally reviewed patient's problem list, allergies, medications, family hx, social hx with patient and update [facility].     REVIEW OF SYSTEMS:  CONSTITUTIONAL:  Denies night sweats, fever or chills.  RESPIRATORY:  Denies wheeze, hemoptysis,  CARDIOVASCULAR:   "Denies chest pains, palpitations, pedal edema     Objective:     /68 (BP Location: Left arm, Patient Position: Sitting, BP Cuff Size: Large adult)   Pulse 85   Temp 36.5 °C (97.7 °F) (Temporal)   Ht 1.549 m (5' 1\")   Wt 96.2 kg (212 lb)   SpO2 100%  Body mass index is 40.06 kg/m².    Physical Exam:  Constitutional: Morbidly obese. awake, alert, in no distress, morbidly obese.  Skin: Warm, dry, good turgor, no rashes, bruises, ulcers in visible areas.   - Acanthosis nigricans of posterior neck  Eye: conjunctiva clear, lids neg for edema or lesions.  ENMT: TM and auditory canals wnl. Lips without lesions, good dentition  - Inflamed nasal mucosa with inferior nasal turbinate hypertrophy, throat is hyperemic, tonsils are not visible.  - Mild tenderness to percussion of the maxillary and frontal sinuses.   Neck: Trachea midline, no masses, no thyromegaly. No cervical or supraclavicular lymphadenopathy  Respiratory: Unlabored respiratory effort, lungs clear to auscultation, no wheezes, no rales.  Cardiovascular: Normal S1, S2, no murmur, no pedal edema.  Psych: Oriented x3, affect and mood wnl, intact judgement and insight.       Assessment and Plan:   The following treatment plan was discussed    1. Acute non-recurrent pansinusitis  - doxycycline (MONODOX) 100 MG capsule; Take 1 Cap by mouth 2 times a day for 5 days.  Dispense: 10 Cap; Refill: 0  - Guaifenesin 1200 MG TABLET SR 12 HR; Take 1 Tab by mouth 2 Times a Day for 10 days.  Dispense: 20 Tab; Refill: 0  - nasal saline irrigation    2. Hypertrophy of both inferior nasal turbinates  - nasal steroid   - nasal saline irrigation.     3. Subclinical hypothyroidism  She was found to have abnormal thyroid function test in 2017 which consistent with subclinical hypothyroidism. She lost follow up until today. She c/o persistent fatigue, 40 lbs weight gain w/o hair loss, GI symptoms, heat/cold tolerance. Plans:   - TSH WITH REFLEX TO FT4; Future    4. Morbid " obesity (HCC)  - REFERRAL TO Pending sale to [facility] (HIP) Services Requested: Medical Weight Management Program, Physician Medical Weight Management Program, Registered Dietitian for Medical Nutrition Therapy; Reason for Referral? BMI>30; Reason for Vi...    5. FHx: diabetes mellitus  6. Acanthosis nigricans  Exam was notable for skin changes consistent with acanthosis nigricans. Positive familial hx of DM. She denies polyuria, polydipsia. She gains 40 lbs since 2017.   - HEMOGLOBIN A1C; Future    7. PE (physical exam), annual  General counseling provided, topics might include: diet, exercise, vitamin supplement, mental health, sleep, stress management, pap, mammogram, colonoscopy and vaccine recommendations.      - VITAMIN D,25 HYDROXY; Future  - HEMOGLOBIN A1C; Future  - Comp Metabolic Panel; Future  - CBC WITH DIFFERENTIAL; Future  - Lipid Profile; Future     Sweta Azul M.D.    Followup: Return for As needed.    Please note that this dictation was created using voice recognition software and/or scribes. I have made every reasonable attempt to correct obvious errors, but I expect that there are errors of grammar and possibly content that I did not discover before finalizing the note.     IKimo (Scribe), am scribing for, and in the presence of, Sweta Azul M.D.    Electronically signed by: Kimo Duffy (Conoribe), 2/21/2020    Sweta GONZALEZ M.D. personally performed the services described in this documentation, as scribed by Kimo Duffy in my presence, and it is both accurate and complete.

## 2020-02-25 PROBLEM — E03.8 SUBCLINICAL HYPOTHYROIDISM: Status: RESOLVED | Noted: 2017-06-29 | Resolved: 2020-02-25

## 2020-03-05 ENCOUNTER — OFFICE VISIT (OUTPATIENT)
Dept: MEDICAL GROUP | Facility: CLINIC | Age: 35
End: 2020-03-05
Payer: COMMERCIAL

## 2020-03-05 VITALS
HEIGHT: 61 IN | DIASTOLIC BLOOD PRESSURE: 78 MMHG | TEMPERATURE: 98.6 F | HEART RATE: 82 BPM | RESPIRATION RATE: 16 BRPM | OXYGEN SATURATION: 96 % | SYSTOLIC BLOOD PRESSURE: 118 MMHG | WEIGHT: 212 LBS | BODY MASS INDEX: 40.02 KG/M2

## 2020-03-05 DIAGNOSIS — M25.641 JOINT STIFFNESS OF HAND, RIGHT: ICD-10-CM

## 2020-03-05 DIAGNOSIS — S62.314G: ICD-10-CM

## 2020-03-05 PROCEDURE — 99024 POSTOP FOLLOW-UP VISIT: CPT | Performed by: FAMILY MEDICINE

## 2020-03-05 ASSESSMENT — FIBROSIS 4 INDEX: FIB4 SCORE: 0.88

## 2020-03-05 NOTE — PROGRESS NOTES
"Subjective:      FOLLOW UP R hand fourth metacarpal fracture     Referred by KAITY Ness for evaluation of RIGHT hand pain (right-hand-dominant)    HPI   RIGHT hand pain has improved  Date of injury, Friday, November 29, 2019  Mechanism of injury, playing with her child and inadvertently tripped on a weight causing her to fall forward and ran into a metal cabinet that  her third and fourth fingers on the RIGHT hand causing sudden sharp pain predominantly at the fourth metacarpal region    She is here for fracture check  No fracture site pain  Hand function is back to NORMAL  She is starting hand therapy shortly, she is still not had her initial session    Works at a school,      Objective:     /78 (BP Location: Left arm, Patient Position: Sitting, BP Cuff Size: Large adult)   Pulse 82   Temp 37 °C (98.6 °F) (Temporal)   Resp 16   Ht 1.549 m (5' 1\")   Wt 96.2 kg (212 lb)   SpO2 96%   BMI 40.06 kg/m²       Physical Exam     Hand exam    NAD  Alert and oriented    BILATERAL hand exam  NO RIGHT hand swelling   MILD knuckle depression at the RIGHT fourth metacarpal head   NO rotational deformity  No fracture site tenderness  Pinky opposition NORMAL  Grind test is NEGATIVE  Collateral ligament testing is NORMAL    Assessment/Plan:     1. Closed displaced fracture of base of fourth metacarpal bone of right hand with delayed healing, subsequent encounter     2. Joint stiffness of hand, right       Date of injury, Friday, November 29, 2019  Mechanism of injury, playing with her child and inadvertently tripped on a weight causing her to fall forward and ran into a metal cabinet that  her third and fourth fingers on the RIGHT hand causing sudden sharp pain predominantly at the fourth metacarpal region    Fracture was stabilized and custom removable ulnar gutter splint (December 3, 2019)  She is out of her ulnar gutter splint, total of 4 weeks (December 31, " 2019)    Clinically she is HEALED  She is not tender at the fracture site on examination  She has been discharged from hand therapy with all goals met    Follow-up as needed    1/2/2020 1:34 PM     HISTORY/REASON FOR EXAM: Right hand pain. Right 4th metacarpal fracture follow-up.        TECHNIQUE/EXAM DESCRIPTION AND NUMBER OF VIEWS:  3 views of the RIGHT hand.     COMPARISON: 12/17/2019, 12/10/2019, 12/3/2019, and 11/30/2019     FINDINGS:  And oblique minimally displaced fracture of the proximal metaphysis/diaphysis of the 4th metacarpal is unchanged in alignment.  No callus formation is identified.  The fracture is without significant change compared with initial examination of 11/30/2019.  The remainder the right hand is intact.     IMPRESSION:     1.  Unchanging appearance of minimally displaced oblique fracture of the proximal metaphysis/diaphysis of the right 4th metacarpal.     2.  No radiographic evidence of healing compared with imaging dating to 11/30/2019. This may reflect persistent motion of the fracture site.      12/17/2019 10:13 AM     HISTORY/REASON FOR EXAM:  History of right hand fracture, follow-up.     TECHNIQUE/EXAM DESCRIPTION AND NUMBER OF VIEWS:  3 views of the RIGHT hand.     COMPARISON: None     FINDINGS:     BONE MINERALIZATION: Normal.  JOINTS: Preserved. No erosions.  FRACTURE: Unchanged obliquely oriented fracture of the proximal fourth metacarpal diaphysis. No periosteal reaction to suggest healing at this time.  DISLOCATION: None.  SOFT TISSUES: No mass.     IMPRESSION:     Unchanged obliquely oriented fracture of the proximal fourth metacarpal diaphysis. No periosteal reaction to suggest healing at this time.      12/10/2019 2:52 PM     HISTORY/REASON FOR EXAM:  Pain/Deformity Following Trauma; Verify fracture stability.  Fracture follow-up     TECHNIQUE/EXAM DESCRIPTION AND NUMBER OF VIEWS:  3 views of the RIGHT hand.     COMPARISON: 12/3/2019     FINDINGS:  Stable oblique/spiral  mildly displaced fracture of the fourth proximal metacarpal shaft. Alignment is stable. No significant callus formation or bony bridging is seen.  The visualized osseous structures are otherwise in anatomic alignment.  The joint spaces are preserved.  Bone mineralization is age-appropriate..        IMPRESSION:     Stable oblique mildly displaced right fourth metacarpal fracture.      12/3/2019 5:18 PM     HISTORY/REASON FOR EXAM:  Follow-up 4th metacarpal fracture.        TECHNIQUE/EXAM DESCRIPTION AND NUMBER OF VIEWS:  3 views of the RIGHT hand.     COMPARISON: 11/30/2019     FINDINGS:     There is no focal soft tissue swelling.     There is an oblique mildly displaced fracture of the right proximal/mid 4th metacarpal which is unchanged in appearance. No significant bridging callus formation is identified during this very short time interval.     Remainder of the right hand is unremarkable.        IMPRESSION:     1.  No interval change in the mildly displaced oblique right 4th metacarpal fracture.        11/30/2019 12:09 PM     HISTORY/REASON FOR EXAM: Right hand pain.        TECHNIQUE/EXAM DESCRIPTION AND NUMBER OF VIEWS:  3 views of the RIGHT hand.     COMPARISON:     FINDINGS: Bone mineralization is normal. There is an obliquely oriented fracture involving the fourth metacarpal diaphysis. Soft tissues are normal.     IMPRESSION:     Fracture the fourth middle carpal diaphysis.    Thank you China Gardiner, ACinthiaPSALUD for allowing me to participate in caring for your patient.

## 2022-03-01 ENCOUNTER — OFFICE VISIT (OUTPATIENT)
Dept: URGENT CARE | Facility: CLINIC | Age: 37
End: 2022-03-01
Payer: COMMERCIAL

## 2022-03-01 VITALS
HEART RATE: 94 BPM | HEIGHT: 61 IN | OXYGEN SATURATION: 98 % | WEIGHT: 197 LBS | RESPIRATION RATE: 16 BRPM | TEMPERATURE: 98.6 F | SYSTOLIC BLOOD PRESSURE: 106 MMHG | BODY MASS INDEX: 37.19 KG/M2 | DIASTOLIC BLOOD PRESSURE: 56 MMHG

## 2022-03-01 DIAGNOSIS — J01.40 ACUTE NON-RECURRENT PANSINUSITIS: ICD-10-CM

## 2022-03-01 PROCEDURE — 99213 OFFICE O/P EST LOW 20 MIN: CPT | Performed by: NURSE PRACTITIONER

## 2022-03-01 RX ORDER — METHYLPREDNISOLONE 4 MG/1
TABLET ORAL
Qty: 21 TABLET | Refills: 0 | Status: SHIPPED | OUTPATIENT
Start: 2022-03-01 | End: 2022-04-01

## 2022-03-01 ASSESSMENT — ENCOUNTER SYMPTOMS
MYALGIAS: 0
SHORTNESS OF BREATH: 0
HEMOPTYSIS: 0
FEVER: 0
WHEEZING: 0
CHILLS: 0
DIARRHEA: 0
COUGH: 1
NAUSEA: 0
SINUS PAIN: 1
VOMITING: 0
SPUTUM PRODUCTION: 1
SINUS PRESSURE: 1
ABDOMINAL PAIN: 0
SORE THROAT: 1
HEADACHES: 1

## 2022-03-01 NOTE — PROGRESS NOTES
Subjective:     Amy Correa is a 36 y.o. female who presents for Sinus Problem (X 4 days, had sore throat and runny nose, sinus pressure, vomited mucous in the morning, had clear nasal drainage: turned green/clear today)      Sinusitis  This is a new problem. The current episode started in the past 7 days (Amy is a pleasant 36 year old female who presents to  today with complaints of sinus pressure, dry throat, congestion, dry cough and headache that started 4 days ago). The problem has been gradually worsening since onset. There has been no fever. Associated symptoms include congestion, coughing, ear pain, headaches, sinus pressure and a sore throat. Pertinent negatives include no chills or shortness of breath. Treatments tried: Mucinex. The treatment provided mild relief.       Review of Systems   Constitutional: Positive for malaise/fatigue. Negative for chills and fever.   HENT: Positive for congestion, ear pain, sinus pressure, sinus pain and sore throat.    Respiratory: Positive for cough and sputum production. Negative for hemoptysis, shortness of breath and wheezing.    Gastrointestinal: Negative for abdominal pain, diarrhea, nausea and vomiting.   Musculoskeletal: Negative for myalgias.   Neurological: Positive for headaches.       PMH:   Past Medical History:   Diagnosis Date   • Known health problems: none    • Microcytic anemia 6/29/2017   • Subclinical hypothyroidism 6/29/2017   • Urinary incontinence      ALLERGIES:   Allergies   Allergen Reactions   • Penicillins Rash     SURGHX:   Past Surgical History:   Procedure Laterality Date   • CYSTOSCOPY STENT PLACEMENT Right 5/7/2018    Procedure: CYSTOSCOPY;  Surgeon: Osmel Seo M.D.;  Location: [facility];  Service: Urology   • URETEROSCOPY Right 5/7/2018    Procedure: URETEROSCOPY;  Surgeon: Osmel Seo M.D.;  Location: [facility];  Service: Urology   • DILATION AND CURETTAGE      2011,  "missed carriage   • HERNIA REPAIR      9 yrs old, umbilical hernia     SOCHX:   Social History     Socioeconomic History   • Marital status:    Tobacco Use   • Smoking status: Never Smoker   • Smokeless tobacco: Never Used   Vaping Use   • Vaping Use: Never used   Substance and Sexual Activity   • Alcohol use: Yes     Alcohol/week: 0.0 oz     Comment: occ   • Drug use: No   • Sexual activity: Yes     Partners: Male     Birth control/protection: Condom     FH:   Family History   Problem Relation Age of Onset   • Diabetes Maternal Uncle    • Diabetes Maternal Grandmother    • No Known Problems Mother    • No Known Problems Sister    • Heart Disease Maternal Grandfather    • No Known Problems Paternal Grandmother    • No Known Problems Paternal Grandfather          Objective:   /56 (BP Location: Left arm, Patient Position: Sitting, BP Cuff Size: Adult)   Pulse 94   Temp 37 °C (98.6 °F) (Temporal)   Resp 16   Ht 1.549 m (5' 1\")   Wt 89.4 kg (197 lb)   SpO2 98%   Breastfeeding No   BMI 37.22 kg/m²     Physical Exam  Vitals and nursing note reviewed.   Constitutional:       General: She is not in acute distress.     Appearance: Normal appearance. She is normal weight. She is ill-appearing.   HENT:      Head: Normocephalic and atraumatic.      Right Ear: Tympanic membrane, ear canal and external ear normal. There is no impacted cerumen.      Left Ear: Tympanic membrane, ear canal and external ear normal. There is no impacted cerumen.      Nose: Congestion present. No rhinorrhea.      Mouth/Throat:      Mouth: Mucous membranes are moist.      Pharynx: Posterior oropharyngeal erythema present. No oropharyngeal exudate.   Eyes:      General:         Right eye: No discharge.      Extraocular Movements: Extraocular movements intact.      Pupils: Pupils are equal, round, and reactive to light.   Cardiovascular:      Rate and Rhythm: Normal rate and regular rhythm.      Pulses: Normal pulses.      Heart " sounds: Normal heart sounds.   Pulmonary:      Effort: Pulmonary effort is normal. No respiratory distress.      Breath sounds: Normal breath sounds. No stridor. No wheezing, rhonchi or rales.   Chest:      Chest wall: No tenderness.   Abdominal:      General: Abdomen is flat. Bowel sounds are normal.      Palpations: Abdomen is soft.      Tenderness: There is no abdominal tenderness. There is no right CVA tenderness or left CVA tenderness.   Musculoskeletal:         General: Normal range of motion.      Cervical back: Normal range of motion and neck supple. No tenderness.   Lymphadenopathy:      Cervical: Cervical adenopathy present.   Skin:     General: Skin is warm and dry.      Capillary Refill: Capillary refill takes less than 2 seconds.   Neurological:      General: No focal deficit present.      Mental Status: She is alert and oriented to person, place, and time. Mental status is at baseline.   Psychiatric:         Mood and Affect: Mood normal.         Behavior: Behavior normal.         Thought Content: Thought content normal.         Judgment: Judgment normal.         Assessment/Plan:   Assessment    1. Acute non-recurrent pansinusitis  methylPREDNISolone (MEDROL DOSEPAK) 4 MG Tablet Therapy Pack     We discussed supportive measures including humidifier, warm salt water gargles, over-the-counter Cepacol throat lozenges, rest  and increased fluids. Pt was encouraged to seek treatment back in the ER or urgent care for worsening symptoms,  fever greater than 100.5, wheezes or shortness of breath.  Discussed with patient symptoms are viral in nature, there is low concern for any respiratory infection currently. Antibiotics are not advised at this time.    AVS handout given and reviewed with patient. Pt educated on red flags and when to seek treatment back in ER or UC.

## 2022-04-01 ENCOUNTER — OFFICE VISIT (OUTPATIENT)
Dept: MEDICAL GROUP | Age: 37
End: 2022-04-01
Payer: COMMERCIAL

## 2022-04-01 ENCOUNTER — HOSPITAL ENCOUNTER (OUTPATIENT)
Dept: RADIOLOGY | Facility: MEDICAL CENTER | Age: 37
End: 2022-04-01
Attending: FAMILY MEDICINE
Payer: COMMERCIAL

## 2022-04-01 VITALS
OXYGEN SATURATION: 98 % | TEMPERATURE: 98 F | DIASTOLIC BLOOD PRESSURE: 56 MMHG | WEIGHT: 198 LBS | BODY MASS INDEX: 37.38 KG/M2 | HEIGHT: 61 IN | SYSTOLIC BLOOD PRESSURE: 106 MMHG | HEART RATE: 93 BPM

## 2022-04-01 DIAGNOSIS — S69.91XA HAND TRAUMA, RIGHT, INITIAL ENCOUNTER: ICD-10-CM

## 2022-04-01 DIAGNOSIS — E66.9 OBESITY (BMI 30-39.9): ICD-10-CM

## 2022-04-01 DIAGNOSIS — T14.8XXA SUBCUTANEOUS HEMATOMA: ICD-10-CM

## 2022-04-01 DIAGNOSIS — Z00.00 PE (PHYSICAL EXAM), ANNUAL: ICD-10-CM

## 2022-04-01 DIAGNOSIS — E55.9 VITAMIN D INSUFFICIENCY: ICD-10-CM

## 2022-04-01 PROCEDURE — 99214 OFFICE O/P EST MOD 30 MIN: CPT | Performed by: FAMILY MEDICINE

## 2022-04-01 PROCEDURE — 73130 X-RAY EXAM OF HAND: CPT | Mod: RT

## 2022-04-01 ASSESSMENT — PATIENT HEALTH QUESTIONNAIRE - PHQ9: CLINICAL INTERPRETATION OF PHQ2 SCORE: 0

## 2022-04-01 NOTE — PROGRESS NOTES
"Subjective:   CC: Right hand pain    HPI:     Amy Correa is a 36 y.o. female, established patient of the clinic.     Patient was in her normal state of health until 4 days ago when she noticed acute development of discomfort at the tip of the right index/thumb as well as soft tissue at the right thenar eminence after she attempted to install the car seat.  She states that she might have trapped her fingers during the process leading to acute injury.  The following day, patient notices soft tissue bruising at the tip of the right index/thumb as well as right thenar eminence.  She also complains of throbbing pain.  She has history of right fourth metatarsal fracture in the past and is concerned of fracture.  She does not like to take any medication.    Patient was found to have vitamin D insufficiency in the past.  She was taking vitamin D supplement, but not currently.  She endorses minimal sun exposure.    Patient has been struggling with obesity and inability to lose weight for a number of years.  She weighs 198 pounds today with BMI of 37.41.  She was previously referred to health improvement program for weight loss, but she did not schedule appointment due to COVID-19 pandemic.    She had normal Pap smear with Dr. Amy Huddleston in 2021.  She will send records to my chart.    Current medicines (including changes today)  No current outpatient medications on file.     No current facility-administered medications for this visit.     She  has a past medical history of Known health problems: none, Microcytic anemia (6/29/2017), Subclinical hypothyroidism (6/29/2017), and Urinary incontinence.    I reviewed patient's problem list, allergies, medications, family hx, social hx with patient and update EPIC.        Objective:     /56 (BP Location: Right arm, Patient Position: Sitting, BP Cuff Size: Adult long)   Pulse 93   Temp 36.7 °C (98 °F) (Temporal)   Ht 1.549 m (5' 1\")   Wt 89.8 kg (198 lb)   SpO2 " 98%  Body mass index is 37.41 kg/m².    Physical Exam:  Constitutional: awake, alert, in no distress.  Skin: Warm, dry, good turgor, no rashes, bruises, ulcers in visible areas.  Neck: Trachea midline, no masses, no thyromegaly. No cervical or supraclavicular lymphadenopathy  Respiratory: Unlabored respiratory effort, lungs clear to auscultation, no wheezes, no rales.  Cardiovascular: Normal S1, bruising at the tip, no murmur, no pedal edema.  Psych: Oriented x3, affect and mood wnl, intact judgement and insight.   MSK: Mild hematoma at the tip of the right thumb, right index, and right thenar eminence with minimal edema.  Intact neuromuscular exam.  Negative bony tenderness to palpation.    Assessment and Plan:   The following treatment plan was discussed    1. Hand trauma, right, initial encounter  2. Subcutaneous hematoma  History and exams are concerning for soft tissue injury leading to hematoma involving the tips of right index, right thumb, and right thenar eminence.  Will order x-ray to rule out fracture.  Conservative management recommended and discussed.  - DX-HAND 3+ RIGHT; Future    3. Obesity (BMI 30-39.9)  - Patient identified as having weight management issue.  Appropriate orders and counseling given.  -Patient will contact local weight loss clinic for consultation as [facility] was terminated.     4. Vitamin D insufficiency  -5000 units of vitamin D daily  - VITAMIN D,25 HYDROXY; Future      Ly GUS Azul M.D.      Followup: Return for As needed.    Please note that this dictation was created using voice recognition software. I have made every reasonable attempt to correct obvious errors, but I expect that there are errors of grammar and possibly content that I did not discover before finalizing the note.

## 2024-01-21 ENCOUNTER — OFFICE VISIT (OUTPATIENT)
Dept: URGENT CARE | Facility: CLINIC | Age: 39
End: 2024-01-21
Payer: COMMERCIAL

## 2024-01-21 VITALS
SYSTOLIC BLOOD PRESSURE: 119 MMHG | OXYGEN SATURATION: 98 % | HEART RATE: 88 BPM | RESPIRATION RATE: 20 BRPM | HEIGHT: 62 IN | WEIGHT: 202 LBS | BODY MASS INDEX: 37.17 KG/M2 | TEMPERATURE: 97.3 F | DIASTOLIC BLOOD PRESSURE: 80 MMHG

## 2024-01-21 DIAGNOSIS — J02.9 VIRAL PHARYNGITIS: ICD-10-CM

## 2024-01-21 DIAGNOSIS — H10.9 BACTERIAL CONJUNCTIVITIS: ICD-10-CM

## 2024-01-21 LAB — S PYO DNA SPEC NAA+PROBE: NOT DETECTED

## 2024-01-21 PROCEDURE — 3079F DIAST BP 80-89 MM HG: CPT

## 2024-01-21 PROCEDURE — 87651 STREP A DNA AMP PROBE: CPT

## 2024-01-21 PROCEDURE — 99213 OFFICE O/P EST LOW 20 MIN: CPT

## 2024-01-21 PROCEDURE — 3074F SYST BP LT 130 MM HG: CPT

## 2024-01-21 RX ORDER — ERYTHROMYCIN 5 MG/G
1 OINTMENT OPHTHALMIC 4 TIMES DAILY
Qty: 1 G | Refills: 0 | Status: SHIPPED | OUTPATIENT
Start: 2024-01-21 | End: 2024-01-26

## 2024-01-21 NOTE — PROGRESS NOTES
"Chief Complaint   Patient presents with    Headache     3 days    Sinusitis     3 days     Burning Eyes     2 days     Pharyngitis     1 days          Subjective:   HISTORY OF PRESENT ILLNESS: Amy Correa is a 38 y.o. female who presents for sinus congestion ans sore throat, now has purelnt discharge to her eyes.    Patient denies fevers or purulent nasal discharge    Medications, Allergies, current problem list, Social and Family history reviewed today in Epic.     Objective:     /80   Pulse 88   Temp 36.3 °C (97.3 °F) (Temporal)   Resp 20   Ht 1.575 m (5' 2\")   Wt 91.6 kg (202 lb)   SpO2 98%     Physical Exam  Vitals reviewed.   Constitutional:       Appearance: Normal appearance. She is not toxic-appearing.   HENT:      Head:      Jaw: No trismus.      Right Ear: Tympanic membrane normal.      Left Ear: Tympanic membrane normal.      Nose: Rhinorrhea present. Rhinorrhea is clear.      Right Sinus: No maxillary sinus tenderness or frontal sinus tenderness.      Left Sinus: No maxillary sinus tenderness or frontal sinus tenderness.      Mouth/Throat:      Mouth: Mucous membranes are moist.      Pharynx: Uvula midline. Posterior oropharyngeal erythema present. No pharyngeal swelling, oropharyngeal exudate or uvula swelling.      Tonsils: No tonsillar exudate or tonsillar abscesses. 1+ on the right. 1+ on the left.      Comments: No muffled voice, trismus, unilateral deviation of the uvula, soft palate fullness or edema. No oral airway compromise, or drooling noted.   Eyes:      General: Lids are normal.         Right eye: Discharge present.         Left eye: Discharge present.     Conjunctiva/sclera: Conjunctivae normal.      Comments: Bilateral green discharge noted in corners of eyes, mild injection   Cardiovascular:      Rate and Rhythm: Normal rate and regular rhythm.      Heart sounds: Normal heart sounds.   Pulmonary:      Effort: Pulmonary effort is normal. No respiratory distress.      " Breath sounds: Normal breath sounds. No decreased breath sounds or wheezing.   Musculoskeletal:      Cervical back: Full passive range of motion without pain and neck supple.   Skin:     General: Skin is warm and dry.   Neurological:      Mental Status: She is alert and oriented to person, place, and time.   Psychiatric:         Mood and Affect: Mood normal.          Assessment/Plan:     Diagnosis and associated orders    I personally reviewed prior external notes and test results pertinent to today's visit.     1. Viral pharyngitis  POCT CEPHEID GROUP A STREP - PCR      2. Bacterial conjunctivitis  POCT CEPHEID GROUP A STREP - PCR    erythromycin 5 MG/GM Ointment          Results for orders placed or performed in visit on 01/21/24   POCT CEPHEID GROUP A STREP - PCR   Result Value Ref Range    POC Group A Strep, PCR Not Detected Not Detected, Invalid       IMPRESSION:  Pt has stable vital signs and no red flag symptoms identified. Exam reveals bilateral purulent discharge from eyes, clear lungs sounds and pharyngeal erythema without exudates.   Informed pt that symptoms are consistent with bacterial conjunctivitis and rhinosinusitis.  .  Advised to use eye drops as prescribed, warm water compresses and cleansing.  Strict hygiene.    OTC symptomatic relief measures were recommended.  Supportive care also discussed to include the use of warm salt water gargles, saline nasal rinses, steam inhalation, and the use of a cool-mist humidifier in the bedroom at night.      Differential diagnosis discussed. Pt was Educated on red flag symptoms. Pt has been Instructed to return to Urgent Care or nearest Emergency Department if symptoms fail to improve, for any change in condition, further concerns, or new concerning symptoms. Patient states understanding of the plan of care and discharge instructions.  They are discharged in stable condition.         Please note that this dictation was created using voice recognition software.  I have made a reasonable attempt to correct obvious errors, but I expect that there are errors of grammar and possibly content that I did not discover before finalizing the note.    This note was electronically signed by LENARD Amin

## 2024-09-30 ENCOUNTER — OFFICE VISIT (OUTPATIENT)
Dept: URGENT CARE | Facility: CLINIC | Age: 39
End: 2024-09-30
Payer: COMMERCIAL

## 2024-09-30 VITALS
HEART RATE: 82 BPM | BODY MASS INDEX: 37.76 KG/M2 | WEIGHT: 200 LBS | SYSTOLIC BLOOD PRESSURE: 110 MMHG | OXYGEN SATURATION: 100 % | RESPIRATION RATE: 16 BRPM | TEMPERATURE: 98.6 F | DIASTOLIC BLOOD PRESSURE: 76 MMHG | HEIGHT: 61 IN

## 2024-09-30 DIAGNOSIS — R05.1 ACUTE COUGH: ICD-10-CM

## 2024-09-30 DIAGNOSIS — J01.10 ACUTE FRONTAL SINUSITIS, RECURRENCE NOT SPECIFIED: ICD-10-CM

## 2024-09-30 RX ORDER — BENZONATATE 100 MG/1
100 CAPSULE ORAL 3 TIMES DAILY PRN
Qty: 60 CAPSULE | Refills: 0 | Status: SHIPPED | OUTPATIENT
Start: 2024-09-30

## 2024-09-30 RX ORDER — FLUTICASONE PROPIONATE 50 MCG
2 SPRAY, SUSPENSION (ML) NASAL DAILY
Qty: 16 G | Refills: 0 | Status: SHIPPED | OUTPATIENT
Start: 2024-09-30 | End: 2024-10-10

## 2024-09-30 NOTE — LETTER
September 30, 2024       Patient: Amy Correa   YOB: 1985   Date of Visit: 9/30/2024         To Whom It May Concern:    Amy was seen in urgent care today. She may return to work tomorrow 10/01/24. Please excuse her work absence.           Sincerely,          SANDIE Ness.  Electronically Signed

## 2024-10-01 NOTE — PROGRESS NOTES
Amy Correa is a 39 y.o. female who presents for Sinus Problem (Pressure, drainage x1wk )      HPI  This is a new problem. Amy Correa is a 39 y.o. patient who presents to urgent care with c/o:  sx started with dry throat and then became runny nose with pressure across her forehead. Started taking allergy medication. Now pain has increased and she has pressure in her nose. Has developed a dry intermittent cough and runny nose has stopped. Frontal headache.   Tx tried: zyrtec, advil ( for headache).     ROS See HPI    Allergies:       Allergies   Allergen Reactions    Penicillin G     Penicillins Rash       PMSFS Hx:  Past Medical History:   Diagnosis Date    Known health problems: none     Microcytic anemia 6/29/2017    Subclinical hypothyroidism 6/29/2017    Urinary incontinence      Past Surgical History:   Procedure Laterality Date    CYSTOSCOPY STENT PLACEMENT Right 5/7/2018    Procedure: CYSTOSCOPY;  Surgeon: Osmel Seo M.D.;  Location: [facility];  Service: Urology    URETEROSCOPY Right 5/7/2018    Procedure: URETEROSCOPY;  Surgeon: Osmel Seo M.D.;  Location: [facility];  Service: Urology    DILATION AND CURETTAGE      2011, missed carriage    HERNIA REPAIR      9 yrs old, umbilical hernia     Family History   Problem Relation Age of Onset    Diabetes Maternal Uncle     Diabetes Maternal Grandmother     No Known Problems Mother     No Known Problems Sister     Heart Disease Maternal Grandfather     No Known Problems Paternal Grandmother     No Known Problems Paternal Grandfather      Social History     Tobacco Use    Smoking status: Never    Smokeless tobacco: Never   Substance Use Topics    Alcohol use: Yes     Alcohol/week: 0.0 oz     Comment: occ       Problems:   Patient Active Problem List   Diagnosis    FHx: diabetes mellitus    Acanthosis nigricans    Hypertrophy of both inferior nasal turbinates    Obesity (BMI 30-39.9)    Vitamin  "D insufficiency       Medications:   No current outpatient medications on file prior to visit.     No current facility-administered medications on file prior to visit.        Objective:     /76   Pulse 82   Temp 37 °C (98.6 °F) (Temporal)   Resp 16   Ht 1.549 m (5' 1\")   Wt 90.7 kg (200 lb)   LMP 09/04/2024 (Approximate)   SpO2 100%   BMI 37.79 kg/m²     Physical Exam  Vitals and nursing note reviewed.   Constitutional:       General: She is not in acute distress.     Appearance: Normal appearance. She is well-developed. She is not ill-appearing.   HENT:      Head: Normocephalic.      Right Ear: Hearing, tympanic membrane, ear canal and external ear normal.      Left Ear: Hearing, tympanic membrane, ear canal and external ear normal.      Nose: Mucosal edema present. No rhinorrhea.      Right Turbinates: Swollen.      Left Turbinates: Swollen.      Right Sinus: Frontal sinus tenderness present. No maxillary sinus tenderness.      Left Sinus: Frontal sinus tenderness present. No maxillary sinus tenderness.      Mouth/Throat:      Mouth: Mucous membranes are moist.      Pharynx: Uvula midline. Oropharyngeal exudate present.   Eyes:      General:         Right eye: No discharge.         Left eye: No discharge.      Conjunctiva/sclera:      Right eye: Right conjunctiva is injected.      Left eye: Left conjunctiva is injected.      Pupils: Pupils are equal, round, and reactive to light.   Neck:      Trachea: Trachea and phonation normal.   Cardiovascular:      Rate and Rhythm: Normal rate and regular rhythm.      Chest Wall: PMI is not displaced.      Pulses: Normal pulses.   Pulmonary:      Effort: Pulmonary effort is normal.      Breath sounds: Normal breath sounds.   Musculoskeletal:      Cervical back: Full passive range of motion without pain, normal range of motion and neck supple.   Lymphadenopathy:      Head:      Right side of head: No tonsillar adenopathy.      Left side of head: No tonsillar " adenopathy.      Cervical: No cervical adenopathy.      Upper Body:      Right upper body: No supraclavicular adenopathy.      Left upper body: No supraclavicular adenopathy.   Skin:     General: Skin is warm and dry.      Capillary Refill: Capillary refill takes less than 2 seconds.   Neurological:      General: No focal deficit present.      Mental Status: She is alert and oriented to person, place, and time.      Gait: Gait normal.   Psychiatric:         Mood and Affect: Mood normal.         Speech: Speech normal.         Behavior: Behavior normal. Behavior is cooperative.         Thought Content: Thought content normal.         Assessment /Associated Orders:      1. Acute cough  benzonatate (TESSALON) 100 MG Cap      2. Acute frontal sinusitis, recurrence not specified  fluticasone (FLONASE) 50 MCG/ACT nasal spray            Medical Decision Making:    Amy is a very pleasant 39 y.o. female who is clinically stable at today's acute urgent care visit.  No acute distress noted.  VSS. Appropriate for outpatient care at this time.   Acute problem today with uncertain prognosis.  No evidence of bacterial infection on examination today.   Educated in proper administration of  prescription medication(s) ordered today including safety, possible SE, risks, benefits, rationale and alternatives to therapy.   OTC non-drowsy antihistamine of choice. Follow manufactures dosing and safety guidelines.    Cool mist humidifier at night prn   OTC  analgesic of choice (acetaminophen or NSAID) prn pain. Follow manufactures dosing and safety precautions.   Keep well hydrated    Discussed Dx, management options (risks,benefits, and alternatives to planned treatment), natural progression and supportive care.  Expressed understanding and the treatment plan was agreed upon.   Questions were encouraged and answered   Return to urgent care prn if new or worsening sx or if there is no improvement in condition prn.    Educated in Red flags  and indications to immediately call 911 or present to the Emergency Department.       Please note that this dictation was created using voice recognition software. I have worked with consultants from the vendor as well as technical experts from [facility] to optimize the interface. I have made every reasonable attempt to correct obvious errors, but I expect that there are errors of grammar and possibly content that I did not discover before finalizing the note.  This note was electronically signed by provider

## 2025-05-27 ENCOUNTER — OFFICE VISIT (OUTPATIENT)
Dept: URGENT CARE | Facility: CLINIC | Age: 40
End: 2025-05-27
Payer: COMMERCIAL

## 2025-05-27 ENCOUNTER — APPOINTMENT (OUTPATIENT)
Dept: RADIOLOGY | Facility: IMAGING CENTER | Age: 40
End: 2025-05-27
Attending: NURSE PRACTITIONER
Payer: COMMERCIAL

## 2025-05-27 VITALS
TEMPERATURE: 99.5 F | DIASTOLIC BLOOD PRESSURE: 74 MMHG | SYSTOLIC BLOOD PRESSURE: 128 MMHG | HEIGHT: 65 IN | HEART RATE: 107 BPM | RESPIRATION RATE: 18 BRPM | OXYGEN SATURATION: 98 % | BODY MASS INDEX: 33.92 KG/M2 | WEIGHT: 203.6 LBS

## 2025-05-27 DIAGNOSIS — R05.1 ACUTE COUGH: ICD-10-CM

## 2025-05-27 DIAGNOSIS — R05.1 ACUTE COUGH: Primary | ICD-10-CM

## 2025-05-27 DIAGNOSIS — J06.9 URI WITH COUGH AND CONGESTION: ICD-10-CM

## 2025-05-27 PROCEDURE — 99213 OFFICE O/P EST LOW 20 MIN: CPT | Performed by: NURSE PRACTITIONER

## 2025-05-27 PROCEDURE — 71046 X-RAY EXAM CHEST 2 VIEWS: CPT | Mod: TC,FY | Performed by: RADIOLOGY

## 2025-05-27 PROCEDURE — 3078F DIAST BP <80 MM HG: CPT | Performed by: NURSE PRACTITIONER

## 2025-05-27 PROCEDURE — 3074F SYST BP LT 130 MM HG: CPT | Performed by: NURSE PRACTITIONER

## 2025-05-27 RX ORDER — BENZONATATE 100 MG/1
100 CAPSULE ORAL 3 TIMES DAILY PRN
Qty: 60 CAPSULE | Refills: 0 | Status: SHIPPED | OUTPATIENT
Start: 2025-05-27

## 2025-05-27 RX ORDER — DOXYCYCLINE HYCLATE 100 MG
100 TABLET ORAL 2 TIMES DAILY
Qty: 14 TABLET | Refills: 0 | Status: SHIPPED | OUTPATIENT
Start: 2025-05-27 | End: 2025-06-03

## 2025-05-27 RX ORDER — DEXTROMETHORPHAN HYDROBROMIDE AND PROMETHAZINE HYDROCHLORIDE 15; 6.25 MG/5ML; MG/5ML
5 SYRUP ORAL EVERY 4 HOURS PRN
Qty: 100 ML | Refills: 0 | Status: SHIPPED | OUTPATIENT
Start: 2025-05-27

## 2025-05-27 ASSESSMENT — ENCOUNTER SYMPTOMS
COUGH: 1
SPUTUM PRODUCTION: 1

## 2025-05-27 NOTE — PROGRESS NOTES
Subjective     Amy Correa is a 39 y.o. female who presents with Cough (Sx started about 3 weeks now but still having a productive cough, a lot of mucus, darker green mucus, chest congestion, chest hurts when coughing, head aches, thought it was seasonal allergies, )            Cough  This is a new problem. Episode onset: pt reports new onset of cough that started 2 weeks ago. she reports the cough has gotten worse in the last few days with new green mucous production. +runny nose. no fevers or chills. feels SOB with coughing. no hx of asthma. non smoker. The cough is Productive of purulent sputum. Associated symptoms comments: Cough is worse at night, has trouble sleeping. Treatments tried: cough lozenges. The treatment provided no relief.       Review of Systems   Respiratory:  Positive for cough and sputum production.    All other systems reviewed and are negative.           Past Medical History[1] Past Surgical History[2]   Social History     Socioeconomic History    Marital status:      Spouse name: Not on file    Number of children: Not on file    Years of education: Not on file    Highest education level: Not on file   Occupational History    Not on file   Tobacco Use    Smoking status: Never    Smokeless tobacco: Never   Vaping Use    Vaping status: Never Used   Substance and Sexual Activity    Alcohol use: Yes     Alcohol/week: 0.0 oz     Comment: occ    Drug use: No     Comment: uses CBD cream    Sexual activity: Yes     Partners: Male     Birth control/protection: Condom   Other Topics Concern    Not on file   Social History Narrative    Not on file     Social Drivers of Health     Financial Resource Strain: Not on file   Food Insecurity: Not on file   Transportation Needs: Not on file   Physical Activity: Not on file   Stress: Not on file   Social Connections: Not on file   Intimate Partner Violence: Not on file   Housing Stability: Not on file       Objective     /74   Pulse (!)  "107   Temp 37.5 °C (99.5 °F) (Temporal)   Resp 18   Ht 1.651 m (5' 5\")   Wt 92.4 kg (203 lb 9.6 oz)   SpO2 98%   BMI 33.88 kg/m²      Physical Exam  Vitals and nursing note reviewed.   Constitutional:       Appearance: Normal appearance. She is normal weight.   HENT:      Head: Normocephalic and atraumatic.      Nose: Nose normal.      Mouth/Throat:      Mouth: Mucous membranes are moist.      Pharynx: Oropharynx is clear.   Eyes:      Extraocular Movements: Extraocular movements intact.      Pupils: Pupils are equal, round, and reactive to light.   Cardiovascular:      Rate and Rhythm: Normal rate and regular rhythm.   Pulmonary:      Effort: Pulmonary effort is normal.      Breath sounds: Normal breath sounds.   Musculoskeletal:         General: Normal range of motion.      Cervical back: Normal range of motion.   Skin:     General: Skin is warm and dry.      Capillary Refill: Capillary refill takes less than 2 seconds.   Neurological:      General: No focal deficit present.      Mental Status: She is alert and oriented to person, place, and time. Mental status is at baseline.   Psychiatric:         Mood and Affect: Mood normal.         Speech: Speech normal.         Thought Content: Thought content normal.         Judgment: Judgment normal.                  DX-CHEST-2 VIEWS  Narrative: 5/27/2025 10:10 AM    HISTORY/REASON FOR EXAM:  Cough.    TECHNIQUE/EXAM DESCRIPTION AND NUMBER OF VIEWS:  Two views of the chest.    COMPARISON:  02/06/2018    FINDINGS:  The heart is normal in size.  No pulmonary infiltrates or consolidations are noted.  No pleural effusions are appreciated.  Impression: No active disease.                    Assessment & Plan  Acute cough    Orders:    DX-CHEST-2 VIEWS; Future    URI with cough and congestion    Orders:    doxycycline (VIBRAMYCIN) 100 MG Tab; Take 1 Tablet by mouth 2 times a day for 7 days.    benzonatate (TESSALON) 100 MG Cap; Take 1 Capsule by mouth 3 times a day as " needed for Cough.    promethazine-dextromethorphan (PROMETHAZINE-DM) 6.25-15 MG/5ML syrup; Take 5 mL by mouth every four hours as needed for Cough.       Concern for developing PNA  Please take full course of abx  Sedating effects of cough syrup discussed   Encouraged rest and fluids  Supportive care, differential diagnoses, and indications for immediate follow-up discussed with patient.    Pathogenesis of diagnosis discussed including typical length and natural progression.    Instructed to return to  or nearest emergency department if symptoms fail to improve, for any change in condition, further concerns, or new concerning symptoms.  Patient states understanding of the plan of care and discharge instructions.                 [1]   Past Medical History:  Diagnosis Date    Known health problems: none     Microcytic anemia 6/29/2017    Subclinical hypothyroidism 6/29/2017    Urinary incontinence    [2]   Past Surgical History:  Procedure Laterality Date    CYSTOSCOPY STENT PLACEMENT Right 5/7/2018    Procedure: CYSTOSCOPY;  Surgeon: Osmel Seo M.D.;  Location: [facility];  Service: Urology    URETEROSCOPY Right 5/7/2018    Procedure: URETEROSCOPY;  Surgeon: Osmel Seo M.D.;  Location: [facility];  Service: Urology    DILATION AND CURETTAGE      2011, missed carriage    HERNIA REPAIR      9 yrs old, umbilical hernia

## 2025-05-29 ENCOUNTER — OFFICE VISIT (OUTPATIENT)
Dept: URGENT CARE | Facility: CLINIC | Age: 40
End: 2025-05-29
Payer: COMMERCIAL

## 2025-05-29 VITALS
SYSTOLIC BLOOD PRESSURE: 116 MMHG | DIASTOLIC BLOOD PRESSURE: 80 MMHG | WEIGHT: 199.6 LBS | TEMPERATURE: 97.8 F | RESPIRATION RATE: 20 BRPM | HEIGHT: 61 IN | HEART RATE: 77 BPM | BODY MASS INDEX: 37.69 KG/M2 | OXYGEN SATURATION: 96 %

## 2025-05-29 DIAGNOSIS — J40 BRONCHITIS: Primary | ICD-10-CM

## 2025-05-29 RX ORDER — METHYLPREDNISOLONE 4 MG/1
TABLET ORAL
Qty: 21 TABLET | Refills: 0 | Status: SHIPPED | OUTPATIENT
Start: 2025-05-29

## 2025-05-29 NOTE — PATIENT INSTRUCTIONS
Thank you for trusting [facility] for your medical care today. You were seen by Sabino Collier MD. We hope that we were able to answer all of your questions, alleviate concerns, and create a plan going forward. If you need anything from us, don't hesitate to reach out.     If you develop any new or worsening symptoms that you believe need urgent or emergent evaluation, be sure to be reevaluated in urgent care or the emergency room.     Sabino Collier MD  Urgent Care

## 2025-05-29 NOTE — PROGRESS NOTES
"Urgent Care Visit Note  [facility]    05/29/25    Amy Correa     [address]  [place] [address]     PCP: Sweta Azul     Subjective:     Chief Complaint   Patient presents with    Cough     2 weeks       HPI:  Amy Correa is a 39 y.o. female who presents for cough.  The patient was seen in urgent care 2 days ago for similar symptoms.  Had a chest x-ray done which was negative and was given antibiotics and cough medication.  She has not improved over the last 2 days, prompting her return visit.    Is not productive.  No shortness of breath.  Believes she had a low-grade fever at 1 point but no fever currently.    ROS:  ROS     CURRENT MEDICATIONS:  Encounter Medications with Dispense Information[1]    ALLERGIES:   Allergies[2]    PROBLEM LIST:    does not have any pertinent problems on file.    Allergies, Medications, & Tobacco/Substance Use were reconciled by the Medical Assistant and reviewed by myself.     Objective:     /80   Pulse 77   Temp 36.6 °C (97.8 °F) (Temporal)   Resp 20   Ht 1.549 m (5' 1\")   Wt 90.5 kg (199 lb 9.6 oz)   SpO2 96%   BMI 37.71 kg/m²     Physical Exam  HENT:      Head: Normocephalic and atraumatic.      Right Ear: External ear normal.      Left Ear: External ear normal.      Nose: Nose normal.      Mouth/Throat:      Mouth: Mucous membranes are moist.      Pharynx: No oropharyngeal exudate or posterior oropharyngeal erythema.   Eyes:      General:         Right eye: No discharge.         Left eye: No discharge.      Extraocular Movements: Extraocular movements intact.      Pupils: Pupils are equal, round, and reactive to light.   Cardiovascular:      Rate and Rhythm: Normal rate and regular rhythm.      Pulses: Normal pulses.   Pulmonary:      Effort: Pulmonary effort is normal.      Breath sounds: Normal breath sounds.   Abdominal:      General: Abdomen is flat.   Musculoskeletal:      Cervical back: No tenderness.   Lymphadenopathy:      Cervical: " No cervical adenopathy.   Skin:     General: Skin is warm.      Capillary Refill: Capillary refill takes less than 2 seconds.   Neurological:      Mental Status: She is alert and oriented to person, place, and time.   Psychiatric:         Mood and Affect: Mood normal.         Behavior: Behavior normal.           Lab Results/POC Test Results   Results for orders placed or performed in visit on 01/21/24   POCT CEPHEID GROUP A STREP - PCR    Collection Time: 01/21/24  2:10 PM   Result Value Ref Range    POC Group A Strep, PCR Not Detected Not Detected, Invalid           DX-CHEST-2 VIEWS  Result Date: 5/27/2025 5/27/2025 10:10 AM HISTORY/REASON FOR EXAM:  Cough. TECHNIQUE/EXAM DESCRIPTION AND NUMBER OF VIEWS: Two views of the chest. COMPARISON:  02/06/2018 FINDINGS: The heart is normal in size. No pulmonary infiltrates or consolidations are noted. No pleural effusions are appreciated.     No active disease.         Assessment/Plan:     Patient's history and physical exam consistent with:    Assessment & Plan  Bronchitis    Orders:    methylPREDNISolone (MEDROL DOSEPAK) 4 MG Tablet Therapy Pack; Follow schedule on package instructions.      Well-appearing here today.  Concern for recent postviral bronchitis.  Low suspicion for bacterial infection such as pneumonia.  Educated on supportive care.  Will trial oral steroids to help control cough symptoms and allow her to sleep better.  Educated on return precautions including ER precautions.    Discussed differential diagnosis, management options, risks/benefits, and alternatives to planned treatment. Pt expressed understanding and the treatment plan was agreed upon. Questions were encouraged and answered. Pt encouraged to return to urgent care as needed if new or worsening symptoms or if there is no improvement in condition. Pt educated in red flags and indications to immediately call 911 or present to the Emergency Department. Advised the patient to follow-up with the  primary care physician for recheck, reevaluation, and further management.    I personally reviewed prior external notes and test results pertinent to today's visit. I have independently reviewed and interpreted all diagnostics ordered during this visit.    Please note that this dictation was created using voice recognition software. I have made a reasonable attempt to correct obvious errors, but I expect that there are errors of grammar and possibly content that I did not discover before finalizing the note.    This note was electronically signed by Sabino Collier MD               [1]   Current Outpatient Medications   Medication Sig Refill Last Dispense    benzonatate (TESSALON) 100 MG Cap Take 1 Capsule by mouth 3 times a day as needed for Cough. 0 Unknown (outside pharmacy)    doxycycline (VIBRAMYCIN) 100 MG Tab Take 1 Tablet by mouth 2 times a day for 7 days. 0 Unknown (outside pharmacy)    methylPREDNISolone (MEDROL DOSEPAK) 4 MG Tablet Therapy Pack Follow schedule on package instructions. 0 Unknown (outside pharmacy)    promethazine-dextromethorphan (PROMETHAZINE-DM) 6.25-15 MG/5ML syrup Take 5 mL by mouth every four hours as needed for Cough. 0 Unknown (outside pharmacy)   [2]   Allergies  Allergen Reactions    Penicillin G     Penicillins Rash

## (undated) DEVICE — MASK ANESTHESIA ADULT  - (100/CA)

## (undated) DEVICE — KIT ROOM DECONTAMINATION

## (undated) DEVICE — WATER IRRIG. STER 3000 ML - (4/CA)

## (undated) DEVICE — SPONGE GAUZESTER 4 X 4 4PLY - (128PK/CA)

## (undated) DEVICE — SENSOR SPO2 NEO LNCS ADHESIVE (20/BX) SEE USER NOTES

## (undated) DEVICE — GOWN WARMING STANDARD FLEX - (30/CA)

## (undated) DEVICE — HEAD HOLDER JUNIOR/ADULT

## (undated) DEVICE — COVER FOOT UNIVERSAL DISP. - (25EA/CA)

## (undated) DEVICE — TUBING CLEARLINK DUO-VENT - C-FLO (48EA/CA)

## (undated) DEVICE — JELLY, KY 2 0Z STERILE

## (undated) DEVICE — CONTAINER SPECIMEN BAG OR - STERILE 4 OZ W/LID (100EA/CA)

## (undated) DEVICE — ELECTRODE 850 FOAM ADHESIVE - HYDROGEL RADIOTRNSPRNT (50/PK)

## (undated) DEVICE — COVER LIGHT HANDLE FLEXIBLE - SOFT (2EA/PK 80PK/CA)

## (undated) DEVICE — PACK CYSTOSCOPY - (14/CA)

## (undated) DEVICE — SLEEVE, VASO, THIGH, MED

## (undated) DEVICE — SET EXTENSION WITH 2 PORTS (48EA/CA) ***PART #2C8610 IS A SUBSTITUTE*****

## (undated) DEVICE — TUBE CONNECT SUCTION CLEAR 120 X 1/4" (50EA/CA)"

## (undated) DEVICE — GOWN SURGEONS X-LARGE - DISP. (30/CA)

## (undated) DEVICE — GLOVE BIOGEL SZ 7.5 SURGICAL PF LTX - (50PR/BX 4BX/CA)

## (undated) DEVICE — MASK AIRWAY SIZE 4 UNIQUE SILICON (10EA/BX)

## (undated) DEVICE — SODIUM CHL. IRRIGATION 0.9% 3000ML (4EA/CA 65CA/PF)

## (undated) DEVICE — SUCTION INSTRUMENT YANKAUER BULBOUS TIP W/O VENT (50EA/CA)

## (undated) DEVICE — GOWN SURGICAL X-LARGE ULTRA - FILM-REINFORCED (20/CA)

## (undated) DEVICE — CATHETER URET OPEN END 6FR (10EA/BX)

## (undated) DEVICE — SET IRRIGATION CYSTOSCOPY Y-TYPE L81 IN (20EA/CA)

## (undated) DEVICE — NEPTUNE 4 PORT MANIFOLD - (20/PK)

## (undated) DEVICE — PROTECTOR ULNA NERVE - (36PR/CA)

## (undated) DEVICE — WATER IRRIG. STER. 1500 ML - (9/CA)

## (undated) DEVICE — KIT ANESTHESIA W/CIRCUIT & 3/LT BAG W/FILTER (20EA/CA)

## (undated) DEVICE — BASKET ZERO TIP

## (undated) DEVICE — ZIPWIRE .038 STRAIGHT BENTSON TIP (5EA/BX)

## (undated) DEVICE — BAG URODRAIN WITH TUBING - (20/CA)